# Patient Record
Sex: FEMALE | Race: WHITE | NOT HISPANIC OR LATINO | Employment: FULL TIME | URBAN - METROPOLITAN AREA
[De-identification: names, ages, dates, MRNs, and addresses within clinical notes are randomized per-mention and may not be internally consistent; named-entity substitution may affect disease eponyms.]

---

## 2017-02-25 ENCOUNTER — HOSPITAL ENCOUNTER (OUTPATIENT)
Dept: LAB | Facility: MEDICAL CENTER | Age: 32
End: 2017-02-25
Attending: OBSTETRICS & GYNECOLOGY
Payer: COMMERCIAL

## 2017-02-25 LAB
BASOPHILS # BLD AUTO: 0.05 K/UL (ref 0–0.12)
BASOPHILS NFR BLD AUTO: 0.8 % (ref 0–1.8)
EOSINOPHIL # BLD: 0.19 K/UL (ref 0–0.51)
EOSINOPHIL NFR BLD AUTO: 2.9 % (ref 0–6.9)
ERYTHROCYTE [DISTWIDTH] IN BLOOD BY AUTOMATED COUNT: 42.4 FL (ref 35.9–50)
HCT VFR BLD AUTO: 41 % (ref 37–47)
HGB BLD-MCNC: 14 G/DL (ref 12–16)
IMM GRANULOCYTES # BLD AUTO: 0.02 K/UL (ref 0–0.11)
IMM GRANULOCYTES NFR BLD AUTO: 0.3 % (ref 0–0.9)
IRON SATN MFR SERPL: 41 % (ref 15–55)
IRON SERPL-MCNC: 181 UG/DL (ref 40–170)
LYMPHOCYTES # BLD: 2.01 K/UL (ref 1–4.8)
LYMPHOCYTES NFR BLD AUTO: 30.6 % (ref 22–41)
MCH RBC QN AUTO: 31.9 PG (ref 27–33)
MCHC RBC AUTO-ENTMCNC: 34.1 G/DL (ref 33.6–35)
MCV RBC AUTO: 93.4 FL (ref 81.4–97.8)
MONOCYTES # BLD: 0.55 K/UL (ref 0–0.85)
MONOCYTES NFR BLD AUTO: 8.4 % (ref 0–13.4)
NEUTROPHILS # BLD: 3.75 K/UL (ref 2–7.15)
NEUTROPHILS NFR BLD AUTO: 57 % (ref 44–72)
NRBC # BLD AUTO: 0 K/UL
NRBC BLD-RTO: 0 /100 WBC
PLATELET # BLD AUTO: 234 K/UL (ref 164–446)
PMV BLD AUTO: 10.2 FL (ref 9–12.9)
RBC # BLD AUTO: 4.39 M/UL (ref 4.2–5.4)
RHEUMATOID FACT SERPL-ACNC: <10 IU/ML (ref 0–14)
T4 FREE SERPL-MCNC: 0.86 NG/DL (ref 0.53–1.43)
TIBC SERPL-MCNC: 438 UG/DL (ref 250–450)
TSH SERPL DL<=0.005 MIU/L-ACNC: 0.9 UIU/ML (ref 0.3–3.7)
WBC # BLD AUTO: 6.6 K/UL (ref 4.8–10.8)

## 2017-02-25 PROCEDURE — 85025 COMPLETE CBC W/AUTO DIFF WBC: CPT

## 2017-02-25 PROCEDURE — 36415 COLL VENOUS BLD VENIPUNCTURE: CPT

## 2017-02-25 PROCEDURE — 84439 ASSAY OF FREE THYROXINE: CPT

## 2017-02-25 PROCEDURE — 84443 ASSAY THYROID STIM HORMONE: CPT

## 2017-02-25 PROCEDURE — 86431 RHEUMATOID FACTOR QUANT: CPT

## 2017-02-25 PROCEDURE — 83540 ASSAY OF IRON: CPT

## 2017-02-25 PROCEDURE — 83550 IRON BINDING TEST: CPT

## 2019-01-30 ENCOUNTER — HOSPITAL ENCOUNTER (OUTPATIENT)
Dept: LAB | Facility: MEDICAL CENTER | Age: 34
End: 2019-01-30
Attending: FAMILY MEDICINE
Payer: COMMERCIAL

## 2019-01-30 ENCOUNTER — OFFICE VISIT (OUTPATIENT)
Dept: MEDICAL GROUP | Facility: PHYSICIAN GROUP | Age: 34
End: 2019-01-30
Payer: COMMERCIAL

## 2019-01-30 VITALS
SYSTOLIC BLOOD PRESSURE: 142 MMHG | HEART RATE: 77 BPM | RESPIRATION RATE: 18 BRPM | TEMPERATURE: 98.4 F | WEIGHT: 154 LBS | OXYGEN SATURATION: 97 % | DIASTOLIC BLOOD PRESSURE: 90 MMHG | BODY MASS INDEX: 24.75 KG/M2 | HEIGHT: 66 IN

## 2019-01-30 DIAGNOSIS — F41.9 ANXIETY: ICD-10-CM

## 2019-01-30 DIAGNOSIS — F12.10 MARIJUANA ABUSE: ICD-10-CM

## 2019-01-30 DIAGNOSIS — Z30.09 BIRTH CONTROL COUNSELING: ICD-10-CM

## 2019-01-30 LAB
ALBUMIN SERPL BCP-MCNC: 4.1 G/DL (ref 3.2–4.9)
ALBUMIN/GLOB SERPL: 1.4 G/DL
ALP SERPL-CCNC: 36 U/L (ref 30–99)
ALT SERPL-CCNC: 10 U/L (ref 2–50)
ANION GAP SERPL CALC-SCNC: 5 MMOL/L (ref 0–11.9)
AST SERPL-CCNC: 17 U/L (ref 12–45)
BILIRUB SERPL-MCNC: 1.2 MG/DL (ref 0.1–1.5)
BUN SERPL-MCNC: 17 MG/DL (ref 8–22)
CALCIUM SERPL-MCNC: 9.3 MG/DL (ref 8.5–10.5)
CHLORIDE SERPL-SCNC: 105 MMOL/L (ref 96–112)
CO2 SERPL-SCNC: 27 MMOL/L (ref 20–33)
CREAT SERPL-MCNC: 0.81 MG/DL (ref 0.5–1.4)
ERYTHROCYTE [DISTWIDTH] IN BLOOD BY AUTOMATED COUNT: 44.8 FL (ref 35.9–50)
GLOBULIN SER CALC-MCNC: 3 G/DL (ref 1.9–3.5)
GLUCOSE SERPL-MCNC: 95 MG/DL (ref 65–99)
HCT VFR BLD AUTO: 42.7 % (ref 37–47)
HGB BLD-MCNC: 14 G/DL (ref 12–16)
MCH RBC QN AUTO: 32.1 PG (ref 27–33)
MCHC RBC AUTO-ENTMCNC: 32.8 G/DL (ref 33.6–35)
MCV RBC AUTO: 97.9 FL (ref 81.4–97.8)
PLATELET # BLD AUTO: 273 K/UL (ref 164–446)
PMV BLD AUTO: 9.9 FL (ref 9–12.9)
POTASSIUM SERPL-SCNC: 4.2 MMOL/L (ref 3.6–5.5)
PROT SERPL-MCNC: 7.1 G/DL (ref 6–8.2)
RBC # BLD AUTO: 4.36 M/UL (ref 4.2–5.4)
SODIUM SERPL-SCNC: 137 MMOL/L (ref 135–145)
T4 FREE SERPL-MCNC: 0.84 NG/DL (ref 0.53–1.43)
TSH SERPL DL<=0.005 MIU/L-ACNC: 0.73 UIU/ML (ref 0.38–5.33)
WBC # BLD AUTO: 9 K/UL (ref 4.8–10.8)

## 2019-01-30 PROCEDURE — 99214 OFFICE O/P EST MOD 30 MIN: CPT | Performed by: FAMILY MEDICINE

## 2019-01-30 PROCEDURE — 80053 COMPREHEN METABOLIC PANEL: CPT

## 2019-01-30 PROCEDURE — 36415 COLL VENOUS BLD VENIPUNCTURE: CPT

## 2019-01-30 PROCEDURE — 84439 ASSAY OF FREE THYROXINE: CPT

## 2019-01-30 PROCEDURE — 85027 COMPLETE CBC AUTOMATED: CPT

## 2019-01-30 PROCEDURE — 84443 ASSAY THYROID STIM HORMONE: CPT

## 2019-01-30 RX ORDER — SERTRALINE HYDROCHLORIDE 25 MG/1
25 TABLET, FILM COATED ORAL DAILY
Qty: 30 TAB | Refills: 11 | Status: SHIPPED | OUTPATIENT
Start: 2019-01-30 | End: 2019-05-10

## 2019-01-30 ASSESSMENT — PATIENT HEALTH QUESTIONNAIRE - PHQ9
SUM OF ALL RESPONSES TO PHQ QUESTIONS 1-9: 8
CLINICAL INTERPRETATION OF PHQ2 SCORE: 2
5. POOR APPETITE OR OVEREATING: 1 - SEVERAL DAYS

## 2019-01-30 NOTE — PROGRESS NOTES
"cc: Anxiety      Subjective:     Lay CARPIO is a 33 y.o. female presenting for the following:     Left lower buttock with enlarging skin lesion for the last few months.  It is still not large but because it sticks up so far it is catching on her clothes and causing rubbing.  This is painful and itchy.    Anxiety/marijuana abuse-patient was a very anxious teenager and still has difficulty with anxiety.  She started using marijuana on a regular basis which was helpful for the anxiety initially.  However, she also notes that she feels more down and less energetic.  She has never tried an SSRI.  She would like to stop using marijuana regularly but when she tries to stop she has severe flares and anxiety.  He has never had any SI/HI.  She does have a young toddler at home which is a source of stress for her.  Also, she is going through a separation.    She has recently started seeing a therapist and is getting along well with her.    Review of systems:  All others reviewed and are negative.       Current Outpatient Prescriptions:   •  sertraline (ZOLOFT) 25 MG tablet, Take 1 Tab by mouth every day., Disp: 30 Tab, Rfl: 11    Allergies, past medical history, past surgical history, family history, social history reviewed and updated    Objective:     Vitals: /90 (BP Location: Left arm, Patient Position: Sitting, BP Cuff Size: Adult)   Pulse 77   Temp 36.9 °C (98.4 °F) (Temporal)   Resp 18   Ht 1.676 m (5' 6\")   Wt 69.9 kg (154 lb)   SpO2 97%   BMI 24.86 kg/m²   General: Alert, pleasant, NAD  HEENT: Normocephalic.   EOMI, no icterus or pallor.  Conjunctivae and lids normal. External ears normal. Oropharynx non-erythematous, mucous membranes moist.    Neck supple.  No thyromegaly or masses palpated. No cervical or supraclavicular lymphadenopathy.  Heart: Regular rate and rhythm.  S1 and S2 normal.  No murmurs appreciated.  Respiratory: Normal respiratory effort.  Clear to auscultation " bilaterally.  Abdomen: Non-distended, soft  Skin: Warm, dry, no rashes.  Musculoskeletal: Gait is normal.  Moves all extremities well.  Extremities: No leg edema.    Neurological: No tremors, sensation grossly intact,  tone/strength normal, gait is normal, CN2-12 grossly intact  Psych:  Affect is normal, judgement is good, memory is intact, grooming is appropriate.    Assessment/Plan:     Lay was seen today for annual exam and contraception.    Diagnoses and all orders for this visit:    Marijuana abuse/Anxiety: Patient would like to stop using marijuana but has difficulty with anxiety when she tries this.  She is currently seeing a therapist.  Discussed risks of SSRIs including black box warning today.  Patient willing to trial Zoloft daily.  Return to clinic in 4 weeks to discuss if well tolerated, return sooner if any severe side effects.  Will check blood tests for medical cause of anxiety but less likely, as patient has had difficulty since teenage years.  -     COMP METABOLIC PANEL; Future  -     sertraline (ZOLOFT) 25 MG tablet; Take 1 Tab by mouth every day.  -     TSH; Future  -     FREE THYROXINE; Future  -     CBC WITHOUT DIFFERENTIAL; Future    Birth control counseling: Patient has had mood side effects with hormonal contraceptives in the past.  She is interested in an IUD as she does not want any more children currently.  -     REFERRAL TO GYNECOLOGY    Return in about 4 weeks (around 2/27/2019) for skin lesion removal.

## 2019-02-25 ENCOUNTER — HOSPITAL ENCOUNTER (OUTPATIENT)
Facility: MEDICAL CENTER | Age: 34
End: 2019-02-25
Attending: FAMILY MEDICINE
Payer: COMMERCIAL

## 2019-02-25 ENCOUNTER — OFFICE VISIT (OUTPATIENT)
Dept: MEDICAL GROUP | Facility: PHYSICIAN GROUP | Age: 34
End: 2019-02-25
Payer: COMMERCIAL

## 2019-02-25 VITALS
HEIGHT: 66 IN | WEIGHT: 154 LBS | OXYGEN SATURATION: 98 % | BODY MASS INDEX: 24.75 KG/M2 | HEART RATE: 77 BPM | TEMPERATURE: 98 F | SYSTOLIC BLOOD PRESSURE: 118 MMHG | DIASTOLIC BLOOD PRESSURE: 78 MMHG | RESPIRATION RATE: 18 BRPM

## 2019-02-25 DIAGNOSIS — R20.9 DISTURBANCE OF SKIN SENSATION: ICD-10-CM

## 2019-02-25 DIAGNOSIS — L98.9 SKIN LESION: ICD-10-CM

## 2019-02-25 LAB — PATHOLOGY CONSULT NOTE: NORMAL

## 2019-02-25 PROCEDURE — 11104 PUNCH BX SKIN SINGLE LESION: CPT | Performed by: FAMILY MEDICINE

## 2019-02-25 PROCEDURE — 88305 TISSUE EXAM BY PATHOLOGIST: CPT

## 2019-02-25 PROCEDURE — 88304 TISSUE EXAM BY PATHOLOGIST: CPT

## 2019-02-25 PROCEDURE — 11105 PUNCH BX SKIN EA SEP/ADDL: CPT | Performed by: FAMILY MEDICINE

## 2019-02-25 NOTE — PROGRESS NOTES
"cc: skin lesion      Subjective:     Lay CARPIO is a 33 y.o. female presenting for the following:     Left lower/inner buttock with enlarging skin lesion for the last few months.  It is still not large but because it sticks up so far it is catching on her clothes and causing rubbing.  This is painful and itchy.    Also, she has noticed a mole on her back is catching on her bra. She thinks it has been there for years but it was not bothersome (itchy or painful) before now.     Review of systems:  All others reviewed and are negative.       Current Outpatient Prescriptions:   •  sertraline (ZOLOFT) 25 MG tablet, Take 1 Tab by mouth every day., Disp: 30 Tab, Rfl: 11    Allergies, past medical history, past surgical history, family history, social history reviewed and updated    Objective:     Vitals: /78 (BP Location: Left arm, Patient Position: Sitting, BP Cuff Size: Adult)   Pulse 77   Temp 36.7 °C (98 °F) (Temporal)   Resp 18   Ht 1.676 m (5' 6\")   Wt 69.9 kg (154 lb)   SpO2 98%   BMI 24.86 kg/m²   General: Alert, pleasant, NAD  Skin: Middle of back with ~5mm raised lesion with irregular hyperpigmentation. Clear borders.   Left inner buttock with ~3mm, raised, hyperpigmented lesion. No surrounding erythema.     Risks of procedure discussed including pain, reaction to anesthetic, infection, scarring, damage to surrounding structures, and possible need for further procedure.     Area cleaned with alcohol swab prior to lidocaine with epinephrine being injected subcutaneously.   Area cleaned x3 with betadine and back lesion removed by 5mm punch biopsy and buttock lesion removed by 3mm punch biopsy. Hemostasis achieved with pressure. Bandage applied.   Specimen sent for pathology.   Minimal blood loss. No complications. Patient tolerated procedure well. Aftercare discussed.       Assessment/Plan:     Lay was seen today for nevus.    Diagnoses and all orders for this visit:    Skin " lesion/Disturbance of skin sensation: 2 enlarging, raised, painful/itchy lesions removed today. Likely benign mole or possibly wart. Will await pathology.   -     Pathology Specimen; Future  -     Pathology Specimen; Future    Return if symptoms worsen or fail to improve.

## 2019-03-01 ENCOUNTER — TELEPHONE (OUTPATIENT)
Dept: MEDICAL GROUP | Facility: PHYSICIAN GROUP | Age: 34
End: 2019-03-01

## 2019-03-01 NOTE — TELEPHONE ENCOUNTER
Future Appointments       Provider Department Center    3/4/2019 2:15 PM Jenn Ulloa M.D. [facility], located within [facility]

## 2019-03-01 NOTE — TELEPHONE ENCOUNTER
----- Message from Jenn Ulloa M.D. sent at 3/1/2019  1:03 PM PST -----  Please call patient and let them know that the mole on her back looked okay but because of the type of mole it is, they suggest a wider excision. I can do this in the office, as it does not need to be a large cut, so please make her an appointment at her convenience.

## 2019-03-04 ENCOUNTER — OFFICE VISIT (OUTPATIENT)
Dept: MEDICAL GROUP | Facility: PHYSICIAN GROUP | Age: 34
End: 2019-03-04
Payer: COMMERCIAL

## 2019-03-04 ENCOUNTER — HOSPITAL ENCOUNTER (OUTPATIENT)
Facility: MEDICAL CENTER | Age: 34
End: 2019-03-04
Attending: FAMILY MEDICINE
Payer: COMMERCIAL

## 2019-03-04 VITALS
WEIGHT: 154 LBS | TEMPERATURE: 99.1 F | HEART RATE: 70 BPM | BODY MASS INDEX: 24.75 KG/M2 | HEIGHT: 66 IN | RESPIRATION RATE: 18 BRPM | SYSTOLIC BLOOD PRESSURE: 116 MMHG | OXYGEN SATURATION: 97 % | DIASTOLIC BLOOD PRESSURE: 70 MMHG

## 2019-03-04 DIAGNOSIS — D22.9 COMPOUND NEVUS: ICD-10-CM

## 2019-03-04 DIAGNOSIS — D48.5 NEOPLASM OF UNCERTAIN BEHAVIOR OF SKIN: ICD-10-CM

## 2019-03-04 PROCEDURE — 11401 EXC TR-EXT B9+MARG 0.6-1 CM: CPT | Performed by: FAMILY MEDICINE

## 2019-03-04 NOTE — Clinical Note
The pathologists recommended a wider excision of this compound nevus (not malignant but sometimes become malignant in the future). But I'm not sure how to bill that.

## 2019-03-04 NOTE — PROGRESS NOTES
"cc: f/u pathology result      Subjective:     Lay Barr is a 33 y.o. female presenting for the following:     Patient recently with biopsy of nevus on back. This was originally done as it was becoming painful and catching on her bra and she thought it was possibly enlarging.  Pathology returned with a compound nevus and a wider excision was recommended by the pathologist.    Patient agrees to have this procedure done today.    Review of systems:  All others reviewed and are negative.       Current Outpatient Prescriptions:   •  sertraline (ZOLOFT) 25 MG tablet, Take 1 Tab by mouth every day., Disp: 30 Tab, Rfl: 11    Allergies, past medical history, past surgical history, family history, social history reviewed and updated    Objective:     Vitals: /70 (BP Location: Right arm, Patient Position: Sitting, BP Cuff Size: Adult)   Pulse 70   Temp 37.3 °C (99.1 °F) (Temporal)   Resp 18   Ht 1.676 m (5' 6\")   Wt 69.9 kg (154 lb)   SpO2 97%   BMI 24.86 kg/m²   General: Alert, pleasant, NAD  Skin: Punch biopsy healing well with healthy scab over.    Risks of procedure discussed including pain, reaction to anesthetic, infection, scarring, damage to surrounding structures, and possible need for further procedure.   Area cleaned with alcohol swab prior to lidocaine with epinephrine being injected subcutaneously.   Area cleaned x3 with betadine and lesion removed by ~1cm excisional biopsy. 3 simple interrupted sutures placed. Hemostasis achieved and Bandage applied.   Specimen sent for pathology.   Minimal blood loss. No complications. Patient tolerated procedure well. Aftercare discussed.       Assessment/Plan:     Lay was seen today for follow-up.    Diagnoses and all orders for this visit:    Compound nevus: wider excision of compound nevus completed today per pathologist's recommendation.   -     Pathology Specimen; Future    Return in about 7 days (around 3/11/2019), or if symptoms worsen or fail " to improve, for stitch removal .

## 2019-03-12 ENCOUNTER — OFFICE VISIT (OUTPATIENT)
Dept: MEDICAL GROUP | Facility: PHYSICIAN GROUP | Age: 34
End: 2019-03-12
Payer: COMMERCIAL

## 2019-03-12 ENCOUNTER — APPOINTMENT (OUTPATIENT)
Dept: MEDICAL GROUP | Facility: PHYSICIAN GROUP | Age: 34
End: 2019-03-12
Payer: COMMERCIAL

## 2019-03-12 VITALS
WEIGHT: 154 LBS | TEMPERATURE: 98 F | HEART RATE: 76 BPM | HEIGHT: 66 IN | DIASTOLIC BLOOD PRESSURE: 76 MMHG | OXYGEN SATURATION: 97 % | SYSTOLIC BLOOD PRESSURE: 116 MMHG | BODY MASS INDEX: 24.75 KG/M2 | RESPIRATION RATE: 18 BRPM

## 2019-03-12 DIAGNOSIS — L02.91 ABSCESS: ICD-10-CM

## 2019-03-12 PROCEDURE — 56405 I&D VULVA/PERINEAL ABSCESS: CPT | Mod: 79 | Performed by: FAMILY MEDICINE

## 2019-03-12 PROCEDURE — 99024 POSTOP FOLLOW-UP VISIT: CPT | Performed by: FAMILY MEDICINE

## 2019-03-12 RX ORDER — DOXYCYCLINE HYCLATE 100 MG
100 TABLET ORAL 2 TIMES DAILY
Qty: 14 TAB | Refills: 0 | Status: SHIPPED | OUTPATIENT
Start: 2019-03-12 | End: 2019-05-10

## 2019-03-12 NOTE — PROGRESS NOTES
"cc: Stitch removal and abscess      Subjective:     Lay Barr is a 33 y.o. female presenting for the following:     Patient presents for stitch removal today but also mentions that she had a Brazilian wax last week and has developed a very sore area that she believes to be an abscess on her right labia majora.    Review of systems:  All others reviewed and are negative.       Current Outpatient Prescriptions:   •  doxycycline (VIBRAMYCIN) 100 MG Tab, Take 1 Tab by mouth 2 times a day., Disp: 14 Tab, Rfl: 0  •  sertraline (ZOLOFT) 25 MG tablet, Take 1 Tab by mouth every day., Disp: 30 Tab, Rfl: 11    Allergies, past medical history, past surgical history, family history, social history reviewed and updated    Objective:     Vitals: /76 (BP Location: Left arm, Patient Position: Sitting, BP Cuff Size: Adult)   Pulse 76   Temp 36.7 °C (98 °F) (Temporal)   Resp 18   Ht 1.676 m (5' 6\")   Wt 69.9 kg (154 lb)   SpO2 97%   BMI 24.86 kg/m²   General: Alert, pleasant, NAD  Skin:   Back: biopsy site healing well.  No erythema or discharge.  3 sutures removed.     Right labia majora-roughly 1 cm round, smooth subcutaneous mass.  Fluctuant.  Small central umbilication draining small amount of purulent fluid.  No surrounding erythema or swelling.    Risks of procedure including the drainage being unsuccessful, need for further drainage, worsening infection discussed.  Area cleaned with Betadine x3 and lidocaine without epinephrine injected subcutaneously.  3 mm incision made and purulent fluid drained with gentle pressure.  Bandage applied.  Minimal bleeding.  Patient did tolerate procedure well considering the area.  Aftercare including warm compresses and monitoring for cellulitis discussed.      Assessment/Plan:     Lay was seen today for lump.    Diagnoses and all orders for this visit:    Abscess-drained with simple incision and drainage today.  As area prone to rubbing, will also give antibiotic " for 7 days.  Discussed with patient signs of worsening infection or reaccumulation.  -     doxycycline (VIBRAMYCIN) 100 MG Tab; Take 1 Tab by mouth 2 times a day.    Return if symptoms worsen or fail to improve.

## 2019-03-12 NOTE — LETTER
[facility]  [address]  [place] [address]     March 12, 2019    Patient: Lay Barr   YOB: 1985   Date of Visit: 3/12/2019       To Whom It May Concern:    Lay Barr was seen and treated in our department on 3/12/2019. She will be able to return to work on 3/14/2019.         Sincerely,           Jenn Ulloa M.D.

## 2019-03-22 DIAGNOSIS — D22.9 COMPOUND NEVUS: ICD-10-CM

## 2019-05-10 ENCOUNTER — OFFICE VISIT (OUTPATIENT)
Dept: URGENT CARE | Facility: MEDICAL CENTER | Age: 34
End: 2019-05-10
Payer: COMMERCIAL

## 2019-05-10 ENCOUNTER — HOSPITAL ENCOUNTER (OUTPATIENT)
Dept: RADIOLOGY | Facility: MEDICAL CENTER | Age: 34
End: 2019-05-10
Attending: NURSE PRACTITIONER
Payer: COMMERCIAL

## 2019-05-10 VITALS
SYSTOLIC BLOOD PRESSURE: 120 MMHG | TEMPERATURE: 98.3 F | DIASTOLIC BLOOD PRESSURE: 90 MMHG | OXYGEN SATURATION: 98 % | BODY MASS INDEX: 25.92 KG/M2 | WEIGHT: 155.6 LBS | HEIGHT: 65 IN | HEART RATE: 88 BPM

## 2019-05-10 DIAGNOSIS — S69.91XA HAND INJURY, RIGHT, INITIAL ENCOUNTER: ICD-10-CM
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
## 2019-05-10 DIAGNOSIS — S60.221A CONTUSION OF RIGHT HAND, INITIAL ENCOUNTER: ICD-10-CM

## 2019-05-10 PROCEDURE — 73130 X-RAY EXAM OF HAND: CPT | Mod: RT

## 2019-05-10 PROCEDURE — 99214 OFFICE O/P EST MOD 30 MIN: CPT | Performed by: NURSE PRACTITIONER

## 2019-05-10 ASSESSMENT — ENCOUNTER SYMPTOMS
NAUSEA: 0
WEAKNESS: 0
FALLS: 0
CHILLS: 0
FEVER: 0
SHORTNESS OF BREATH: 0
ROS SKIN COMMENTS: NO AREA OF ABRASION
TINGLING: 0
WHEEZING: 0
SENSORY CHANGE: 0

## 2019-05-10 NOTE — LETTER
May 10, 2019         Patient: Lay Barr   YOB: 1985   Date of Visit: 5/10/2019           To Whom it May Concern:    Lay Barr was seen in my clinic on 5/10/2019. Please excuse her from work 5/10/2019 through 5/12/2019. She may return on 5/13/2019.    If you have any questions or concerns, please don't hesitate to call.        Sincerely,           SIL David.  Electronically Signed

## 2019-05-11 NOTE — PROGRESS NOTES
"Subjective:   Lay Barr is a 33 y.o. female who presents for Fall (xlast night; chasing dog and fell on R wrist )        Hand Injury   This is a new problem. The current episode started yesterday (States she was walking dog and pulled and accidentally tripped and fell onto right hand). The problem occurs constantly. The problem has been waxing and waning. Pertinent negatives include no chest pain, chills, congestion, fever, nausea or weakness. Exacerbated by: Making a fist. She has tried ice for the symptoms. The treatment provided mild relief.    Denies LOC or hitting hand.    Review of Systems   Constitutional: Negative for chills and fever.   HENT: Negative for congestion.    Respiratory: Negative for shortness of breath and wheezing.    Cardiovascular: Negative for chest pain.   Gastrointestinal: Negative for nausea.   Musculoskeletal: Positive for joint pain. Negative for falls.        Right hand pain   Skin:        No area of abrasion   Neurological: Negative for tingling, sensory change and weakness.     PMH:  has a past medical history of Allergy.  MEDS: No current outpatient prescriptions on file.  ALLERGIES: No Known Allergies  SURGHX:   Past Surgical History:   Procedure Laterality Date   • ABDOMINAL EXPLORATION      partal stomach removal (age 17)     SOCHX:  reports that she has never smoked. She has never used smokeless tobacco. She reports that she drinks alcohol. She reports that she uses drugs, including Marijuana.  FH: Family history was reviewed, no pertinent findings to report     Objective:   /90   Pulse 88   Temp 36.8 °C (98.3 °F) (Temporal)   Ht 1.651 m (5' 5\")   Wt 70.6 kg (155 lb 9.6 oz)   SpO2 98%   BMI 25.89 kg/m²   Physical Exam   Constitutional: She appears well-developed and well-nourished. No distress.   HENT:   Head: Normocephalic.   Right Ear: Hearing normal.   Left Ear: Hearing normal.   Nose: Nose normal.   Eyes: Pupils are equal, round, and reactive to light. " "Conjunctivae, EOM and lids are normal.   Neck: Normal range of motion.   Cardiovascular: Normal rate, regular rhythm and normal heart sounds.    Pulmonary/Chest: Effort normal and breath sounds normal. No respiratory distress. She has no decreased breath sounds. She has no wheezes.   Musculoskeletal:        Right wrist: She exhibits normal range of motion, no tenderness and no swelling.        Right hand: She exhibits decreased range of motion, tenderness and decreased capillary refill.        Hands:  Swelling and bruising noted to right ring, index and middle fingers. Pain when trying to make a fist to middle finger majority.   Neurological: She is alert.   Skin: Skin is warm. No abrasion and no rash noted. She is not diaphoretic.   Psychiatric: She has a normal mood and affect. Her speech is normal and behavior is normal. Judgment and thought content normal.   Vitals reviewed.        Assessment/Plan:   Assessment    1. Hand injury, right, initial encounter  - DX-HAND 3+ RIGHT; Future    2. Fall, initial encounter    3. Contusion of right hand, initial encounter    Xray hand:\"No radiographic evidence of acute injury.\"  Rest and elevate the affected area with pillows.  You may apply ice packs to the affected area up to 4 times daily for 30 minutes at a time  You may apply compression to the affected area by wrapping with an ace bandage  May take over-the-counter Ibuprofen 600-800 mg every 8 hours as needed for pain    Differential diagnosis, natural history, supportive care, and indications for immediate follow-up discussed.       "

## 2019-08-28 ENCOUNTER — HOSPITAL ENCOUNTER (OUTPATIENT)
Dept: RADIOLOGY | Facility: MEDICAL CENTER | Age: 34
End: 2019-08-28
Attending: OBSTETRICS & GYNECOLOGY
Payer: COMMERCIAL

## 2019-08-28 DIAGNOSIS — N64.4 PAINFUL BREASTS: ICD-10-CM

## 2019-08-28 DIAGNOSIS — N63.0 LUMP OR MASS IN BREAST: ICD-10-CM

## 2019-08-28 PROCEDURE — G0279 TOMOSYNTHESIS, MAMMO: HCPCS

## 2019-08-28 PROCEDURE — 76642 ULTRASOUND BREAST LIMITED: CPT | Mod: RT

## 2019-09-04 ENCOUNTER — OFFICE VISIT (OUTPATIENT)
Dept: MEDICAL GROUP | Facility: PHYSICIAN GROUP | Age: 34
End: 2019-09-04
Payer: COMMERCIAL

## 2019-09-04 VITALS
WEIGHT: 155 LBS | RESPIRATION RATE: 18 BRPM | HEART RATE: 78 BPM | BODY MASS INDEX: 25.83 KG/M2 | SYSTOLIC BLOOD PRESSURE: 120 MMHG | TEMPERATURE: 98 F | DIASTOLIC BLOOD PRESSURE: 88 MMHG | OXYGEN SATURATION: 98 % | HEIGHT: 65 IN

## 2019-09-04 DIAGNOSIS — Z91.89 OTHER SPECIFIED PERSONAL RISK FACTORS, NOT ELSEWHERE CLASSIFIED: ICD-10-CM

## 2019-09-04 DIAGNOSIS — Z13.6 SCREENING FOR CARDIOVASCULAR CONDITION: ICD-10-CM

## 2019-09-04 DIAGNOSIS — R93.89 ABNORMAL FINDING ON IMAGING: ICD-10-CM

## 2019-09-04 DIAGNOSIS — I70.90 ATHEROSCLEROSIS: ICD-10-CM

## 2019-09-04 PROCEDURE — 99213 OFFICE O/P EST LOW 20 MIN: CPT | Performed by: FAMILY MEDICINE

## 2019-09-04 NOTE — PROGRESS NOTES
"cc: abnormal mammogram      Subjective:     Lay Barr is a 33 y.o. female presenting for the following:     Patient recently completed mammogram and ultrasound of breast for multiple palpable lumps.  No signs of malignancy or cysts on breasts but imaging does note \"Calcified plaque in the arteries in bilateral breasts somewhat atypical in a 33-year-old. Recommend correlation with cholesterol and possible further evaluation with cardiac calcium scoring CT.\"    Patient does not smoke cigarettes.  No known family history of early cardiac disease or stroke, but some parts of the family unknown.    Review of systems:  All others reviewed and are negative.     No current outpatient medications on file.    Allergies, past medical history, past surgical history, family history, social history reviewed and updated    Objective:     Vitals: /88 (BP Location: Right arm, Patient Position: Sitting, BP Cuff Size: Adult)   Pulse 78   Temp 36.7 °C (98 °F) (Temporal)   Resp 18   Ht 1.651 m (5' 5\")   Wt 70.3 kg (155 lb)   SpO2 98%   BMI 25.79 kg/m²   General: Alert, pleasant, NAD  HEENT:   Neck supple. No bruit.  No thyromegaly or masses palpated.   Heart: Regular rate and rhythm.  S1 and S2 normal.  No murmurs appreciated.  Respiratory: Normal respiratory effort.  Clear to auscultation bilaterally.  Extremities: No leg edema.      Assessment/Plan:     Lay was seen today for warts and results.    Diagnoses and all orders for this visit:    Patient without signs of malignancy on recent breast ultrasound mammogram but does have multiple lumps.  Her mother is adopted so unknown known family history.  Suggest healthy [facility] for testing for BRCA gene.  Also, imaging with calcifications in the arteries.  Will check lipid panel and cardiac calcium scoring to ensure patient is not high risk.  She does not have diabetes and she does not smoke cigarettes.    Abnormal finding on imaging  -     CT-CARDIAC " SCORING; Future    Screening for cardiovascular condition  -     Lipid Profile; Future    Other specified personal risk factors, not elsewhere classified  -     CT-CARDIAC SCORING; Future    Atherosclerosis  -     CT-CARDIAC SCORING; Future      Return if symptoms worsen or fail to improve.

## 2019-09-05 PROBLEM — R93.89 ABNORMAL FINDING ON IMAGING: Status: ACTIVE | Noted: 2019-09-05

## 2019-09-27 ENCOUNTER — OFFICE VISIT (OUTPATIENT)
Dept: URGENT CARE | Facility: CLINIC | Age: 34
End: 2019-09-27
Payer: COMMERCIAL

## 2019-09-27 VITALS
TEMPERATURE: 98.5 F | HEART RATE: 83 BPM | DIASTOLIC BLOOD PRESSURE: 72 MMHG | OXYGEN SATURATION: 100 % | HEIGHT: 66 IN | SYSTOLIC BLOOD PRESSURE: 118 MMHG | WEIGHT: 160 LBS | BODY MASS INDEX: 25.71 KG/M2 | RESPIRATION RATE: 16 BRPM

## 2019-09-27 DIAGNOSIS — J02.8 ACUTE PHARYNGITIS DUE TO OTHER SPECIFIED ORGANISMS: ICD-10-CM

## 2019-09-27 DIAGNOSIS — J02.9 SORE THROAT: ICD-10-CM

## 2019-09-27 LAB
INT CON NEG: NEGATIVE
INT CON POS: POSITIVE
S PYO AG THROAT QL: NEGATIVE

## 2019-09-27 PROCEDURE — 99213 OFFICE O/P EST LOW 20 MIN: CPT | Performed by: NURSE PRACTITIONER

## 2019-09-27 PROCEDURE — 87880 STREP A ASSAY W/OPTIC: CPT | Performed by: NURSE PRACTITIONER

## 2019-09-27 ASSESSMENT — ENCOUNTER SYMPTOMS
SORE THROAT: 1
COUGH: 0
DIARRHEA: 0
EYE DISCHARGE: 0
MYALGIAS: 0
ORTHOPNEA: 0
HEADACHES: 0
CHILLS: 1
NAUSEA: 0
FEVER: 0

## 2019-09-28 NOTE — PROGRESS NOTES
Subjective:      Lay Barr is a 33 y.o. female who presents with Pharyngitis (with chils since this morning.)            HPI New. 33 year old female with sore throat and chills since this morning. She denies fever, nausea, congestion or cough. She has no ear pain or neck pain. She has not taken any medication for this issue.  Patient has no known allergies.  No current outpatient medications on file prior to visit.     No current facility-administered medications on file prior to visit.      Social History     Socioeconomic History   • Marital status:      Spouse name: Not on file   • Number of children: Not on file   • Years of education: Not on file   • Highest education level: Not on file   Occupational History   • Not on file   Social Needs   • Financial resource strain: Not on file   • Food insecurity:     Worry: Not on file     Inability: Not on file   • Transportation needs:     Medical: Not on file     Non-medical: Not on file   Tobacco Use   • Smoking status: Never Smoker   • Smokeless tobacco: Never Used   Substance and Sexual Activity   • Alcohol use: Yes     Comment: occ/socially    • Drug use: Yes     Types: Marijuana     Comment: daily use    • Sexual activity: Yes     Partners: Male     Birth control/protection: Condom   Lifestyle   • Physical activity:     Days per week: Not on file     Minutes per session: Not on file   • Stress: Not on file   Relationships   • Social connections:     Talks on phone: Not on file     Gets together: Not on file     Attends Yarsanism service: Not on file     Active member of club or organization: Not on file     Attends meetings of clubs or organizations: Not on file     Relationship status: Not on file   • Intimate partner violence:     Fear of current or ex partner: Not on file     Emotionally abused: Not on file     Physically abused: Not on file     Forced sexual activity: Not on file   Other Topics Concern   • Not on file   Social History  "Narrative   • Not on file     Breast Cancer-related family history is not on file.      Review of Systems   Constitutional: Positive for chills and malaise/fatigue. Negative for fever.   HENT: Positive for sore throat. Negative for congestion.    Eyes: Negative for discharge.   Respiratory: Negative for cough.    Cardiovascular: Negative for chest pain and orthopnea.   Gastrointestinal: Negative for diarrhea and nausea.   Musculoskeletal: Negative for myalgias.   Neurological: Negative for headaches.   Endo/Heme/Allergies: Negative for environmental allergies.          Objective:     /72 (BP Location: Left arm, Patient Position: Sitting, BP Cuff Size: Adult)   Pulse 83   Temp 36.9 °C (98.5 °F) (Temporal)   Resp 16   Ht 1.676 m (5' 6\")   Wt 72.6 kg (160 lb)   SpO2 100%   BMI 25.82 kg/m²      Physical Exam   Constitutional: She is oriented to person, place, and time. She appears well-developed and well-nourished. No distress.   HENT:   Head: Normocephalic and atraumatic.   Right Ear: External ear and ear canal normal. Tympanic membrane is not injected and not perforated. No middle ear effusion.   Left Ear: External ear and ear canal normal. Tympanic membrane is not injected and not perforated.  No middle ear effusion.   Nose: Mucosal edema present.   Mouth/Throat: Posterior oropharyngeal erythema present. No oropharyngeal exudate.   Eyes: Conjunctivae are normal. Right eye exhibits no discharge. Left eye exhibits no discharge.   Neck: Normal range of motion. Neck supple.   Cardiovascular: Normal rate, regular rhythm and normal heart sounds.   No murmur heard.  Pulmonary/Chest: Effort normal and breath sounds normal. No respiratory distress.   Musculoskeletal: Normal range of motion.   Normal movement of all 4 extremities.   Lymphadenopathy:     She has no cervical adenopathy.        Right: No supraclavicular adenopathy present.        Left: No supraclavicular adenopathy present.   Neurological: She is " alert and oriented to person, place, and time. Gait normal.   Skin: Skin is warm and dry.   Psychiatric: She has a normal mood and affect. Her behavior is normal. Thought content normal.   Nursing note and vitals reviewed.              Assessment/Plan:     1. Acute pharyngitis due to other specified organisms     2. Sore throat  POCT Rapid Strep A     Strep negative.  Viral illness at this time with no indication for antibiotics. Reviewed with patient expected course of illness and also reviewed OTC medications that may be used for symptom relief. Follow up 7-10 days if not improving.

## 2020-10-28 ENCOUNTER — HOSPITAL ENCOUNTER (OUTPATIENT)
Facility: MEDICAL CENTER | Age: 35
End: 2020-10-28
Attending: NURSE PRACTITIONER
Payer: COMMERCIAL

## 2020-10-28 PROCEDURE — 81003 URINALYSIS AUTO W/O SCOPE: CPT

## 2020-10-28 PROCEDURE — 87086 URINE CULTURE/COLONY COUNT: CPT

## 2020-10-30 LAB
APPEARANCE UR: CLEAR
BILIRUB UR QL STRIP.AUTO: NEGATIVE
COLOR UR: YELLOW
GLUCOSE UR STRIP.AUTO-MCNC: NEGATIVE MG/DL
KETONES UR STRIP.AUTO-MCNC: NEGATIVE MG/DL
LEUKOCYTE ESTERASE UR QL STRIP.AUTO: NEGATIVE
MICRO URNS: NORMAL
NITRITE UR QL STRIP.AUTO: NEGATIVE
PH UR STRIP.AUTO: 5.5 [PH] (ref 5–8)
PROT UR QL STRIP: NEGATIVE MG/DL
RBC UR QL AUTO: NEGATIVE
SP GR UR STRIP.AUTO: 1.02
UROBILINOGEN UR STRIP.AUTO-MCNC: 0.2 MG/DL

## 2020-11-01 LAB
BACTERIA UR CULT: NORMAL
SIGNIFICANT IND 70042: NORMAL
SITE SITE: NORMAL
SOURCE SOURCE: NORMAL

## 2021-05-27 ENCOUNTER — HOSPITAL ENCOUNTER (OUTPATIENT)
Dept: LAB | Facility: MEDICAL CENTER | Age: 36
End: 2021-05-27
Attending: NURSE PRACTITIONER
Payer: COMMERCIAL

## 2021-05-27 LAB
ALBUMIN SERPL BCP-MCNC: 4.3 G/DL (ref 3.2–4.9)
ALBUMIN/GLOB SERPL: 1.5 G/DL
ALP SERPL-CCNC: 41 U/L (ref 30–99)
ALT SERPL-CCNC: 13 U/L (ref 2–50)
ANION GAP SERPL CALC-SCNC: 10 MMOL/L (ref 7–16)
AST SERPL-CCNC: 16 U/L (ref 12–45)
BASOPHILS # BLD AUTO: 0.7 % (ref 0–1.8)
BASOPHILS # BLD: 0.05 K/UL (ref 0–0.12)
BILIRUB SERPL-MCNC: 1.6 MG/DL (ref 0.1–1.5)
BUN SERPL-MCNC: 13 MG/DL (ref 8–22)
CALCIUM SERPL-MCNC: 9.5 MG/DL (ref 8.5–10.5)
CHLORIDE SERPL-SCNC: 106 MMOL/L (ref 96–112)
CHOLEST SERPL-MCNC: 170 MG/DL (ref 100–199)
CO2 SERPL-SCNC: 24 MMOL/L (ref 20–33)
CREAT SERPL-MCNC: 0.85 MG/DL (ref 0.5–1.4)
EOSINOPHIL # BLD AUTO: 0.27 K/UL (ref 0–0.51)
EOSINOPHIL NFR BLD: 3.9 % (ref 0–6.9)
ERYTHROCYTE [DISTWIDTH] IN BLOOD BY AUTOMATED COUNT: 41.1 FL (ref 35.9–50)
FASTING STATUS PATIENT QL REPORTED: NORMAL
GLOBULIN SER CALC-MCNC: 2.8 G/DL (ref 1.9–3.5)
GLUCOSE SERPL-MCNC: 87 MG/DL (ref 65–99)
HCT VFR BLD AUTO: 40.9 % (ref 37–47)
HDLC SERPL-MCNC: 52 MG/DL
HGB BLD-MCNC: 14.8 G/DL (ref 12–16)
IMM GRANULOCYTES # BLD AUTO: 0.03 K/UL (ref 0–0.11)
IMM GRANULOCYTES NFR BLD AUTO: 0.4 % (ref 0–0.9)
LDLC SERPL CALC-MCNC: 108 MG/DL
LYMPHOCYTES # BLD AUTO: 1.71 K/UL (ref 1–4.8)
LYMPHOCYTES NFR BLD: 24.9 % (ref 22–41)
MCH RBC QN AUTO: 34 PG (ref 27–33)
MCHC RBC AUTO-ENTMCNC: 36.2 G/DL (ref 33.6–35)
MCV RBC AUTO: 94 FL (ref 81.4–97.8)
MONOCYTES # BLD AUTO: 0.54 K/UL (ref 0–0.85)
MONOCYTES NFR BLD AUTO: 7.9 % (ref 0–13.4)
NEUTROPHILS # BLD AUTO: 4.27 K/UL (ref 2–7.15)
NEUTROPHILS NFR BLD: 62.2 % (ref 44–72)
NRBC # BLD AUTO: 0 K/UL
NRBC BLD-RTO: 0 /100 WBC
PLATELET # BLD AUTO: 273 K/UL (ref 164–446)
PMV BLD AUTO: 9.5 FL (ref 9–12.9)
POTASSIUM SERPL-SCNC: 4.5 MMOL/L (ref 3.6–5.5)
PROT SERPL-MCNC: 7.1 G/DL (ref 6–8.2)
RBC # BLD AUTO: 4.35 M/UL (ref 4.2–5.4)
SODIUM SERPL-SCNC: 140 MMOL/L (ref 135–145)
TRIGL SERPL-MCNC: 51 MG/DL (ref 0–149)
TSH SERPL DL<=0.005 MIU/L-ACNC: 1.23 UIU/ML (ref 0.38–5.33)
WBC # BLD AUTO: 6.9 K/UL (ref 4.8–10.8)

## 2021-05-27 PROCEDURE — 82306 VITAMIN D 25 HYDROXY: CPT

## 2021-05-27 PROCEDURE — 85025 COMPLETE CBC W/AUTO DIFF WBC: CPT

## 2021-05-27 PROCEDURE — 80061 LIPID PANEL: CPT

## 2021-05-27 PROCEDURE — 36415 COLL VENOUS BLD VENIPUNCTURE: CPT

## 2021-05-27 PROCEDURE — 80053 COMPREHEN METABOLIC PANEL: CPT

## 2021-05-27 PROCEDURE — 84443 ASSAY THYROID STIM HORMONE: CPT

## 2021-05-29 LAB — 25(OH)D3 SERPL-MCNC: 29 NG/ML (ref 30–80)

## 2021-06-01 ENCOUNTER — OFFICE VISIT (OUTPATIENT)
Dept: MEDICAL GROUP | Facility: PHYSICIAN GROUP | Age: 36
End: 2021-06-01
Payer: COMMERCIAL

## 2021-06-01 VITALS
WEIGHT: 146 LBS | RESPIRATION RATE: 16 BRPM | HEART RATE: 80 BPM | OXYGEN SATURATION: 98 % | TEMPERATURE: 98.3 F | SYSTOLIC BLOOD PRESSURE: 112 MMHG | BODY MASS INDEX: 24.32 KG/M2 | HEIGHT: 65 IN | DIASTOLIC BLOOD PRESSURE: 80 MMHG

## 2021-06-01 DIAGNOSIS — Z01.84 IMMUNITY STATUS TESTING: ICD-10-CM

## 2021-06-01 DIAGNOSIS — N94.9 VAGINAL LUMP: ICD-10-CM

## 2021-06-01 DIAGNOSIS — R22.1 LUMP ON NECK: ICD-10-CM

## 2021-06-01 PROCEDURE — 99213 OFFICE O/P EST LOW 20 MIN: CPT | Performed by: NURSE PRACTITIONER

## 2021-06-01 ASSESSMENT — FIBROSIS 4 INDEX: FIB4 SCORE: 0.57

## 2021-06-01 ASSESSMENT — PATIENT HEALTH QUESTIONNAIRE - PHQ9: CLINICAL INTERPRETATION OF PHQ2 SCORE: 0

## 2021-06-01 NOTE — ASSESSMENT & PLAN NOTE
Chronic and ongoing. She states that she has felt the lump/mass on her posterior neck for the past few months. She states that she has lost 40 pounds since December 2020 and is wondering if she is noticing it more since she lost weight. She denies any pain to the area. She has not had any imaging done.

## 2021-06-01 NOTE — ASSESSMENT & PLAN NOTE
Acute and ongoing. She was told by her GYNO to establish with a PCP since she has. She states that there are times it is painful when she has sex. She is scheduled to have an ultrasound on 6/23/2021.

## 2021-06-01 NOTE — PROGRESS NOTES
Subjective  Chief Complaint  Establish care to manage her chronic conditions    History of Present Illness  Lay Barr is a 35 y.o. female. This patient is here today to establish care.  Her prior PCP was Dr. Jenn Ulloa.    Vaginal lump  Acute and ongoing. She was told by her GYNO to establish with a PCP since she has. She states that there are times it is painful when she has sex. She is scheduled to have an ultrasound on 6/23/2021.    Lump on neck  Chronic and ongoing. She states that she has felt the lump/mass on her posterior neck for the past few months. She states that she has lost 40 pounds since December 2020 and is wondering if she is noticing it more since she lost weight. She denies any pain to the area. She has not had any imaging done.    Past Medical History    Allergies: Patient has no known allergies.  Past Medical History:   Diagnosis Date   • Allergy      Past Surgical History:   Procedure Laterality Date   • ABDOMINAL EXPLORATION      partal stomach removal (age 17)     No current [facility]-ordered outpatient medications on file.     No current [facility]-ordered facility-administered medications on file.     Family History:    Family History   Problem Relation Age of Onset   • Lung Disease Mother         COPD   • Diabetes Mother    • Heart Disease Mother    • Hypertension Mother    • Stroke Father    • Hyperlipidemia Father    • Hypertension Father    • Thyroid Father       Personal/Social History:    Social History     Tobacco Use   • Smoking status: Never Smoker   • Smokeless tobacco: Never Used   Vaping Use   • Vaping Use: Never used   Substance Use Topics   • Alcohol use: Not Currently     Comment: Quit 7 months ago    • Drug use: Yes     Types: Marijuana     Comment: daily use      Social History     Social History Narrative   • Not on file      Review of Systems:     General: Negative for fever/chills and unexpected weight change.    Eyes:  Negative for vision changes, eye pain.   ENT:   "Negative for hearing changes, ear pain, congestion, sore throat, and neck pain.    Respiratory:  Negative for cough and dyspnea.     Cardiovascular:  Negative for chest pain and palpitations.   Gastrointestinal:  Negative for nausea/vomiting, changes in bowel habits, and abdominal pain.    Genitourinary:  Negative for dysuria and hematuria.    Musculoskeletal:  Negative for myalgias.    Skin:  Positive for lump.    Neurological:  Negative for numbness/tingling and headaches.    Heme/Lymph:  Does not bruise/bleed easily.     Objective  Physical Exam:   /80 (BP Location: Right arm, Patient Position: Sitting, BP Cuff Size: Adult)   Pulse 80   Temp 36.8 °C (98.3 °F) (Temporal)   Resp 16   Ht 1.651 m (5' 5\")   Wt 66.2 kg (146 lb)   SpO2 98%  Body mass index is 24.3 kg/m².  General:  Alert and oriented.  Well appearing.  NAD.  Head:  Normocephalic.   Eyes:  Eyes conjunctiva clear lids without ptosis, pupils equal and reactive to light accommodation.    Neck: Supple without JVD. No lymphadenopathy.  Pulmonary:  Normal effort.  Clear to ausculation without rales, ronchi, or wheezing.  Cardiovascular:  Regular rate and rhythm without murmur, rubs or gallop.  Radial pulses are intact and equal bilaterally.  Musculoskeletal:  No extremity cyanosis, clubbing, or edema.  Skin:  Warm and dry.  Palpable lump to posterior neck.  Psych: Normal mood and affect. Alert and oriented x3. Judgment and insight is normal.      Assessment/Plan   1. Vaginal lump  Acute and ongoing.  Did not examine area as patient just saw her GYNO and has an ultrasound ordered of the lump.    2. Lump on neck  Chronic and ongoing.  Discussed getting an ultrasound, she is agreeable.  - US-SOFT TISSUES OF HEAD - NECK; Future    3. Immunity status testing  - HEP B SURFACE AB; Future      Health Maintenance: Completed    Return in about 1 year (around 6/1/2022), or if symptoms worsen or fail to improve.    Please note that this dictation was created " using voice recognition software. I have made every reasonable attempt to correct obvious errors, but I expect that there are errors of grammar and possibly content that I did not discover before finalizing the note.    DALIA Zurita  [facility]

## 2021-06-29 ENCOUNTER — HOSPITAL ENCOUNTER (OUTPATIENT)
Dept: RADIOLOGY | Facility: MEDICAL CENTER | Age: 36
End: 2021-06-29
Attending: OBSTETRICS & GYNECOLOGY
Payer: COMMERCIAL

## 2021-06-29 ENCOUNTER — HOSPITAL ENCOUNTER (OUTPATIENT)
Dept: RADIOLOGY | Facility: MEDICAL CENTER | Age: 36
End: 2021-06-29
Attending: NURSE PRACTITIONER
Payer: COMMERCIAL

## 2021-06-29 DIAGNOSIS — R19.07 GENERALIZED ABDOMINAL OR PELVIC SWELLING OR MASS OR LUMP: ICD-10-CM

## 2021-06-29 DIAGNOSIS — R22.1 LUMP ON NECK: ICD-10-CM

## 2021-06-29 PROCEDURE — 76536 US EXAM OF HEAD AND NECK: CPT

## 2021-06-29 PROCEDURE — 76830 TRANSVAGINAL US NON-OB: CPT

## 2021-06-30 ENCOUNTER — TELEPHONE (OUTPATIENT)
Dept: MEDICAL GROUP | Facility: PHYSICIAN GROUP | Age: 36
End: 2021-06-30

## 2021-06-30 NOTE — TELEPHONE ENCOUNTER
Informed pt of Dr Andrade Tabor message below.  Advised pt to make sure if she has any other questions to follow up with her PCP.    Ultrasound result  Received: Today  Andrade Tabor III, M.D.  Mariluz Pete, Beny Ass't  Please let the patient know that Anabella is out of the office the rest of this week.  I did review her ultrasound report and it shows a normal-appearing lymph node of her neck.  No further work-up is needed.    Dr. Zafar

## 2021-07-01 ENCOUNTER — TELEPHONE (OUTPATIENT)
Dept: MEDICAL GROUP | Facility: PHYSICIAN GROUP | Age: 36
End: 2021-07-01

## 2021-07-01 NOTE — RESULT ENCOUNTER NOTE
Please call Lay and let her know that the results of her ultrasound of her neck reveal superficial normal lymph node, which is normal.  The radiologist does not recommend any follow up.  If she has any further questions, let her know Anabella will be back next week,  Laya:)

## 2021-07-01 NOTE — TELEPHONE ENCOUNTER
----- Message from SALVADOR Quiroga sent at 7/1/2021  9:22 AM PDT -----  Please call Lay and let her know that the results of her ultrasound of her neck reveal superficial normal lymph node, which is normal.  The radiologist does not recommend any follow up.  If she has any further questions, let her know Anabella will be back next week,  Laya:)

## 2021-08-30 ENCOUNTER — PRE-ADMISSION TESTING (OUTPATIENT)
Dept: ADMISSIONS | Facility: MEDICAL CENTER | Age: 36
End: 2021-08-30
Attending: SURGERY
Payer: COMMERCIAL

## 2021-08-30 NOTE — PREPROCEDURE INSTRUCTIONS
"Pre-admit appointment completed. \"Preparing for your Procedure\" instructions given to Pt via phone with verbal, emailed written instructions, along with video content. Pt states all instructions given are understood and to call pre-admit or Dr's office for additional questions or any symptoms of illness/covid develop prior to DOS. Medications the patient will take the morning of surgery per anesthesia protocol: None    Denies anesthesia complications  "

## 2021-09-20 ENCOUNTER — ANESTHESIA (OUTPATIENT)
Dept: SURGERY | Facility: MEDICAL CENTER | Age: 36
End: 2021-09-20
Payer: COMMERCIAL

## 2021-09-20 ENCOUNTER — ANESTHESIA EVENT (OUTPATIENT)
Dept: SURGERY | Facility: MEDICAL CENTER | Age: 36
End: 2021-09-20
Payer: COMMERCIAL

## 2021-09-20 ENCOUNTER — HOSPITAL ENCOUNTER (OUTPATIENT)
Facility: MEDICAL CENTER | Age: 36
End: 2021-09-20
Attending: SURGERY | Admitting: SURGERY
Payer: COMMERCIAL

## 2021-09-20 VITALS
OXYGEN SATURATION: 93 % | WEIGHT: 145.5 LBS | TEMPERATURE: 97.2 F | HEIGHT: 65 IN | SYSTOLIC BLOOD PRESSURE: 143 MMHG | RESPIRATION RATE: 16 BRPM | HEART RATE: 66 BPM | DIASTOLIC BLOOD PRESSURE: 76 MMHG | BODY MASS INDEX: 24.24 KG/M2

## 2021-09-20 DIAGNOSIS — G89.18 POSTOPERATIVE PAIN: ICD-10-CM

## 2021-09-20 LAB
EXTERNAL QUALITY CONTROL: NORMAL
HCG UR QL: NEGATIVE
SARS-COV+SARS-COV-2 AG RESP QL IA.RAPID: NEGATIVE

## 2021-09-20 PROCEDURE — 81025 URINE PREGNANCY TEST: CPT

## 2021-09-20 PROCEDURE — 700101 HCHG RX REV CODE 250: Performed by: ANESTHESIOLOGY

## 2021-09-20 PROCEDURE — 700101 HCHG RX REV CODE 250: Performed by: SURGERY

## 2021-09-20 PROCEDURE — 160009 HCHG ANES TIME/MIN: Performed by: SURGERY

## 2021-09-20 PROCEDURE — 160041 HCHG SURGERY MINUTES - EA ADDL 1 MIN LEVEL 4: Performed by: SURGERY

## 2021-09-20 PROCEDURE — 502714 HCHG ROBOTIC SURGERY SERVICES: Performed by: SURGERY

## 2021-09-20 PROCEDURE — 500002 HCHG ADHESIVE, DERMABOND: Performed by: SURGERY

## 2021-09-20 PROCEDURE — 160035 HCHG PACU - 1ST 60 MINS PHASE I: Performed by: SURGERY

## 2021-09-20 PROCEDURE — C1781 MESH (IMPLANTABLE): HCPCS | Performed by: SURGERY

## 2021-09-20 PROCEDURE — 160029 HCHG SURGERY MINUTES - 1ST 30 MINS LEVEL 4: Performed by: SURGERY

## 2021-09-20 PROCEDURE — 160046 HCHG PACU - 1ST 60 MINS PHASE II: Performed by: SURGERY

## 2021-09-20 PROCEDURE — 87426 SARSCOV CORONAVIRUS AG IA: CPT | Performed by: SURGERY

## 2021-09-20 PROCEDURE — 700111 HCHG RX REV CODE 636 W/ 250 OVERRIDE (IP): Performed by: ANESTHESIOLOGY

## 2021-09-20 PROCEDURE — 160002 HCHG RECOVERY MINUTES (STAT): Performed by: SURGERY

## 2021-09-20 PROCEDURE — 160048 HCHG OR STATISTICAL LEVEL 1-5: Performed by: SURGERY

## 2021-09-20 PROCEDURE — A9270 NON-COVERED ITEM OR SERVICE: HCPCS | Performed by: ANESTHESIOLOGY

## 2021-09-20 PROCEDURE — 700102 HCHG RX REV CODE 250 W/ 637 OVERRIDE(OP): Performed by: ANESTHESIOLOGY

## 2021-09-20 PROCEDURE — 160025 RECOVERY II MINUTES (STATS): Performed by: SURGERY

## 2021-09-20 PROCEDURE — 501570 HCHG TROCAR, SEPARATOR: Performed by: SURGERY

## 2021-09-20 PROCEDURE — 700105 HCHG RX REV CODE 258: Performed by: SURGERY

## 2021-09-20 PROCEDURE — 501838 HCHG SUTURE GENERAL: Performed by: SURGERY

## 2021-09-20 DEVICE — MESH PROGRIP LAPROSCOPIC SELF FIXATING (1/CA): Type: IMPLANTABLE DEVICE | Site: GROIN | Status: FUNCTIONAL

## 2021-09-20 RX ORDER — KETOROLAC TROMETHAMINE 30 MG/ML
INJECTION, SOLUTION INTRAMUSCULAR; INTRAVENOUS PRN
Status: DISCONTINUED | OUTPATIENT
Start: 2021-09-20 | End: 2021-09-20 | Stop reason: SURG

## 2021-09-20 RX ORDER — DIPHENHYDRAMINE HYDROCHLORIDE 50 MG/ML
25 INJECTION INTRAMUSCULAR; INTRAVENOUS EVERY 6 HOURS PRN
Status: DISCONTINUED | OUTPATIENT
Start: 2021-09-20 | End: 2021-09-20 | Stop reason: HOSPADM

## 2021-09-20 RX ORDER — BUPIVACAINE HYDROCHLORIDE AND EPINEPHRINE 5; 5 MG/ML; UG/ML
INJECTION, SOLUTION PERINEURAL
Status: DISCONTINUED | OUTPATIENT
Start: 2021-09-20 | End: 2021-09-20 | Stop reason: HOSPADM

## 2021-09-20 RX ORDER — OXYCODONE HCL 5 MG/5 ML
5 SOLUTION, ORAL ORAL
Status: COMPLETED | OUTPATIENT
Start: 2021-09-20 | End: 2021-09-20

## 2021-09-20 RX ORDER — ONDANSETRON 2 MG/ML
INJECTION INTRAMUSCULAR; INTRAVENOUS PRN
Status: DISCONTINUED | OUTPATIENT
Start: 2021-09-20 | End: 2021-09-20 | Stop reason: SURG

## 2021-09-20 RX ORDER — OXYCODONE HYDROCHLORIDE 5 MG/1
5 TABLET ORAL EVERY 4 HOURS PRN
Qty: 12 TABLET | Refills: 0 | Status: SHIPPED | OUTPATIENT
Start: 2021-09-20 | End: 2021-09-23

## 2021-09-20 RX ORDER — ONDANSETRON 4 MG/1
4 TABLET, ORALLY DISINTEGRATING ORAL EVERY 6 HOURS PRN
Qty: 18 TABLET | Refills: 0 | Status: SHIPPED | OUTPATIENT
Start: 2021-09-20 | End: 2023-02-02

## 2021-09-20 RX ORDER — DIPHENHYDRAMINE HYDROCHLORIDE 50 MG/ML
12.5 INJECTION INTRAMUSCULAR; INTRAVENOUS
Status: DISCONTINUED | OUTPATIENT
Start: 2021-09-20 | End: 2021-09-20 | Stop reason: HOSPADM

## 2021-09-20 RX ORDER — LIDOCAINE HYDROCHLORIDE 20 MG/ML
INJECTION, SOLUTION EPIDURAL; INFILTRATION; INTRACAUDAL; PERINEURAL PRN
Status: DISCONTINUED | OUTPATIENT
Start: 2021-09-20 | End: 2021-09-20 | Stop reason: SURG

## 2021-09-20 RX ORDER — IBUPROFEN 600 MG/1
600 TABLET ORAL EVERY 6 HOURS
Qty: 45 TABLET | Refills: 0 | Status: SHIPPED | OUTPATIENT
Start: 2021-09-20 | End: 2023-02-02

## 2021-09-20 RX ORDER — SODIUM CHLORIDE, SODIUM LACTATE, POTASSIUM CHLORIDE, CALCIUM CHLORIDE 600; 310; 30; 20 MG/100ML; MG/100ML; MG/100ML; MG/100ML
INJECTION, SOLUTION INTRAVENOUS CONTINUOUS
Status: DISCONTINUED | OUTPATIENT
Start: 2021-09-20 | End: 2021-09-20 | Stop reason: HOSPADM

## 2021-09-20 RX ORDER — POLYETHYLENE GLYCOL 3350 17 G/17G
17 POWDER, FOR SOLUTION ORAL DAILY
Qty: 20 EACH | Refills: 0 | Status: SHIPPED | OUTPATIENT
Start: 2021-09-20 | End: 2023-02-02

## 2021-09-20 RX ORDER — HALOPERIDOL 5 MG/ML
1 INJECTION INTRAMUSCULAR
Status: DISCONTINUED | OUTPATIENT
Start: 2021-09-20 | End: 2021-09-20 | Stop reason: HOSPADM

## 2021-09-20 RX ORDER — ACETAMINOPHEN 325 MG/1
650 TABLET ORAL EVERY 6 HOURS
Qty: 60 TABLET | Refills: 0 | Status: SHIPPED | OUTPATIENT
Start: 2021-09-20 | End: 2023-02-02

## 2021-09-20 RX ORDER — OXYCODONE HCL 5 MG/5 ML
10 SOLUTION, ORAL ORAL
Status: COMPLETED | OUTPATIENT
Start: 2021-09-20 | End: 2021-09-20

## 2021-09-20 RX ORDER — DIPHENHYDRAMINE HCL 25 MG
25 TABLET ORAL EVERY 6 HOURS PRN
Status: DISCONTINUED | OUTPATIENT
Start: 2021-09-20 | End: 2021-09-20 | Stop reason: HOSPADM

## 2021-09-20 RX ORDER — HYDROMORPHONE HYDROCHLORIDE 1 MG/ML
0.1 INJECTION, SOLUTION INTRAMUSCULAR; INTRAVENOUS; SUBCUTANEOUS
Status: DISCONTINUED | OUTPATIENT
Start: 2021-09-20 | End: 2021-09-20 | Stop reason: HOSPADM

## 2021-09-20 RX ORDER — HYDROMORPHONE HYDROCHLORIDE 1 MG/ML
0.4 INJECTION, SOLUTION INTRAMUSCULAR; INTRAVENOUS; SUBCUTANEOUS
Status: DISCONTINUED | OUTPATIENT
Start: 2021-09-20 | End: 2021-09-20 | Stop reason: HOSPADM

## 2021-09-20 RX ORDER — MEPERIDINE HYDROCHLORIDE 25 MG/ML
6.25 INJECTION INTRAMUSCULAR; INTRAVENOUS; SUBCUTANEOUS
Status: DISCONTINUED | OUTPATIENT
Start: 2021-09-20 | End: 2021-09-20 | Stop reason: HOSPADM

## 2021-09-20 RX ORDER — CEFAZOLIN SODIUM 1 G/3ML
INJECTION, POWDER, FOR SOLUTION INTRAMUSCULAR; INTRAVENOUS PRN
Status: DISCONTINUED | OUTPATIENT
Start: 2021-09-20 | End: 2021-09-20 | Stop reason: SURG

## 2021-09-20 RX ORDER — ONDANSETRON 2 MG/ML
4 INJECTION INTRAMUSCULAR; INTRAVENOUS
Status: COMPLETED | OUTPATIENT
Start: 2021-09-20 | End: 2021-09-20

## 2021-09-20 RX ORDER — HYDROMORPHONE HYDROCHLORIDE 1 MG/ML
0.2 INJECTION, SOLUTION INTRAMUSCULAR; INTRAVENOUS; SUBCUTANEOUS
Status: DISCONTINUED | OUTPATIENT
Start: 2021-09-20 | End: 2021-09-20 | Stop reason: HOSPADM

## 2021-09-20 RX ADMIN — FENTANYL CITRATE 50 MCG: 50 INJECTION, SOLUTION INTRAMUSCULAR; INTRAVENOUS at 09:06

## 2021-09-20 RX ADMIN — OXYCODONE HYDROCHLORIDE 10 MG: 5 SOLUTION ORAL at 09:14

## 2021-09-20 RX ADMIN — CEFAZOLIN 1 G: 330 INJECTION, POWDER, FOR SOLUTION INTRAMUSCULAR; INTRAVENOUS at 07:45

## 2021-09-20 RX ADMIN — ROCURONIUM BROMIDE 50 MG: 10 INJECTION INTRAVENOUS at 07:37

## 2021-09-20 RX ADMIN — LIDOCAINE HYDROCHLORIDE 0.5 ML: 10 INJECTION, SOLUTION INFILTRATION; PERINEURAL at 07:03

## 2021-09-20 RX ADMIN — SUCCINYLCHOLINE CHLORIDE 100 MG: 20 INJECTION, SOLUTION INTRAMUSCULAR; INTRAVENOUS at 07:37

## 2021-09-20 RX ADMIN — FENTANYL CITRATE 250 MCG: 50 INJECTION, SOLUTION INTRAMUSCULAR; INTRAVENOUS at 07:29

## 2021-09-20 RX ADMIN — MIDAZOLAM HYDROCHLORIDE 2 MG: 1 INJECTION, SOLUTION INTRAMUSCULAR; INTRAVENOUS at 07:29

## 2021-09-20 RX ADMIN — LIDOCAINE HYDROCHLORIDE 100 MG: 20 INJECTION, SOLUTION EPIDURAL; INFILTRATION; INTRACAUDAL; PERINEURAL at 07:37

## 2021-09-20 RX ADMIN — SODIUM CHLORIDE, POTASSIUM CHLORIDE, SODIUM LACTATE AND CALCIUM CHLORIDE: 600; 310; 30; 20 INJECTION, SOLUTION INTRAVENOUS at 07:03

## 2021-09-20 RX ADMIN — ONDANSETRON 4 MG: 2 INJECTION INTRAMUSCULAR; INTRAVENOUS at 08:47

## 2021-09-20 RX ADMIN — SUGAMMADEX 200 MG: 100 INJECTION, SOLUTION INTRAVENOUS at 08:47

## 2021-09-20 RX ADMIN — ONDANSETRON 4 MG: 2 INJECTION INTRAMUSCULAR; INTRAVENOUS at 09:02

## 2021-09-20 RX ADMIN — FENTANYL CITRATE 25 MCG: 50 INJECTION, SOLUTION INTRAMUSCULAR; INTRAVENOUS at 09:14

## 2021-09-20 RX ADMIN — KETOROLAC TROMETHAMINE 30 MG: 30 INJECTION, SOLUTION INTRAMUSCULAR at 08:47

## 2021-09-20 RX ADMIN — FENTANYL CITRATE 25 MCG: 50 INJECTION, SOLUTION INTRAMUSCULAR; INTRAVENOUS at 09:20

## 2021-09-20 RX ADMIN — PROPOFOL 200 MG: 10 INJECTION, EMULSION INTRAVENOUS at 07:37

## 2021-09-20 RX ADMIN — MEPERIDINE HYDROCHLORIDE 6.25 MG: 25 INJECTION INTRAMUSCULAR; INTRAVENOUS; SUBCUTANEOUS at 09:03

## 2021-09-20 ASSESSMENT — FIBROSIS 4 INDEX: FIB4 SCORE: 0.57

## 2021-09-20 ASSESSMENT — PAIN DESCRIPTION - PAIN TYPE: TYPE: ACUTE PAIN;SURGICAL PAIN

## 2021-09-20 NOTE — OR NURSING
0859- Pt arrived from OR post REPAIR, HERNIA, INGUINAL, ROBOT-ASSISTED, USING DA TRACY XI - bilateral WITH MESH - Bilateral, report received. Pt breathing spontaneously on 6L O2 via mask. 3 incision sites to abdomen clean and dry, closed with dermabond. CMS intact. Pt shivering and reporting pain 8/10 and nausea. Cold pack placed to abdomen.       0903-medicated for nausea and shivering.     0911-pt no longer shivering     0914-nausea Improved. Given oral medication for pain. Tolerating PO     0915-placed on bedpan     0918-bedpan removed pt unable to pee     0920-pt reports pain level still 4/10, medicated per orders     0930-called and updated pts family-Rufino     0944-called report to Cecille ESCOTO

## 2021-09-20 NOTE — ANESTHESIA PROCEDURE NOTES
Airway    Date/Time: 9/20/2021 7:37 AM  Performed by: Fam Irvin M.D.  Authorized by: Fam Irvin M.D.     Location:  OR  Urgency:  Elective  Indications for Airway Management:  Anesthesia      Spontaneous Ventilation: absent    Sedation Level:  Deep  Preoxygenated: Yes    Patient Position:  Sniffing  Final Airway Type:  Endotracheal airway  Final Endotracheal Airway:  ETT  Cuffed: Yes    Technique Used for Successful ETT Placement:  Direct laryngoscopy    Insertion Site:  Oral  Blade Type:  Shaggy  Laryngoscope Blade/Videolaryngoscope Blade Size:  3  ETT Size (mm):  7.0  Measured from:  Teeth  ETT to Teeth (cm):  22  Placement Verified by: auscultation and capnometry    Cormack-Lehane Classification:  Grade I - full view of glottis  Number of Attempts at Approach:  1

## 2021-09-20 NOTE — DISCHARGE INSTRUCTIONS
ACTIVITY: Rest and take it easy for the first 24 hours.  A responsible adult is recommended to remain with you during that time.  It is normal to feel sleepy.  We encourage you to not do anything that requires balance, judgment or coordination.    MILD FLU-LIKE SYMPTOMS ARE NORMAL. YOU MAY EXPERIENCE GENERALIZED MUSCLE ACHES, THROAT IRRITATION, HEADACHE AND/OR SOME NAUSEA.    FOR 24 HOURS DO NOT:  Drive, operate machinery or run household appliances.  Drink beer or alcoholic beverages.   Make important decisions or sign legal documents.    SPECIAL INSTRUCTIONS:    Inguinal Discharge Instructions:    ACTIVITIES: Upon discharge from the hospital, the day of surgery it is requested that you do no significant physical activity and limit mental activities, as you have had sedation. The day after surgery, you may resume activities of daily living, but for four weeks, it is recommended that you do no strenuous activities or heavy lifting (greater than 15 pounds).     DRIVING: You may drive whenever you are off pain medications and are able to perform the activities needed to drive, i.e. turning, bending, twisting, etc.     WOUND: It is not unusual for patients to experience swelling and even bruising at the hernia repair site. With inguinal hernias, sometimes the bruising and swelling may extend on to the penis or into the scrotum of male patients. This will resolve over the next few days.     ICE: please use ice on the wound to decrease the swelling for the first 24 hours and then discontinue.     BATHING: The dressing can be removed 48 hours after surgery and the wound can then be wetted in a shower as normal, but avoid submersion in water (tub bath) for at least 2 weeks.    PAIN MEDICATION: You will be given a prescription for pain medication at discharge. Please take these as directed. It is important to remember not to take medications on an empty stomach as this may cause nausea.     BOWEL FUNCTION: After hernia  repair, it is not uncommon for patients to experience constipation. This is due to decreasing activity levels as well as pain medications. You may wish to use a stool softener beginning immediately after surgery, and you may or may not need to use a laxative (Milk of Magnesia, Ex-lax; Senokot, etc.) as well.            CALL IF YOU HAVE: Drainage or fluid from incision that may be foul smelling,  increased tenderness or soreness at the wound or the wound edges are no  longer together,redness or swelling at the incision site. Please do not hesitate to  call with any other questions.     APPOINTMENT: Contact our office at [phone] for a follow-up appointment in 1 week following your procedure.     If you have any additional questions, please do not hesitate to call the office.       DIET: To avoid nausea, slowly advance diet as tolerated, avoiding spicy or greasy foods for the first day. INCREASE FLUIDS AND FIBER TO AVOID CONSTIPATION.      You should CALL YOUR PHYSICIAN if you develop:  Fever greater than 101 degrees F.  Pain not relieved by medication, or persistent nausea or vomiting.  Excessive bleeding (blood soaking through dressing) or unexpected drainage from the wound.  Extreme redness or swelling around the incision site, drainage of pus or foul smelling drainage.  Inability to urinate or empty your bladder within 8 hours.  Problems with breathing or chest pain.    You should call [phone] if you develop problems with breathing or chest pain.  If you are unable to contact your doctor or surgical center, you should go to the nearest emergency room or urgent care center.  Physician's telephone #: [phone]    If any questions arise, call your doctor.  If your doctor is not available, please feel free to call the Surgical Center at [phone]. The Contact Center is open Monday through Friday 7AM to 5PM and may speak to a nurse at [phone], or toll free at [phone].     A registered nurse may call  you a few days after your surgery to see how you are doing after your procedure.    MEDICATIONS: Resume taking daily medication.  Take prescribed pain medication with food.  If no medication is prescribed, you may take non-aspirin pain medication if needed.  PAIN MEDICATION CAN BE VERY CONSTIPATING.  Take a stool softener or laxative such as senokot, pericolace, or milk of magnesia if needed.    Prescription given for tylenol, motrin, oxycodone .  Last pain medication given at 0915 am.    If your physician has prescribed pain medication that includes Acetaminophen (Tylenol), do not take additional Acetaminophen (Tylenol) while taking the prescribed medication.    Depression / Suicide Risk    As you are discharged from this [facility] facility, it is important to learn how to keep safe from harming yourself.    Recognize the warning signs:  · Abrupt changes in personality, positive or negative- including increase in energy   · Giving away possessions  · Change in eating patterns- significant weight changes-  positive or negative  · Change in sleeping patterns- unable to sleep or sleeping all the time   · Unwillingness or inability to communicate  · Depression  · Unusual sadness, discouragement and loneliness  · Talk of wanting to die  · Neglect of personal appearance   · Rebelliousness- reckless behavior  · Withdrawal from people/activities they love  · Confusion- inability to concentrate     If you or a loved one observes any of these behaviors or has concerns about self-harm, here's what you can do:  · Talk about it- your feelings and reasons for harming yourself  · Remove any means that you might use to hurt yourself (examples: pills, rope, extension cords, firearm)  · Get professional help from the community (Mental Health, Substance Abuse, psychological counseling)  · Do not be alone:Call your Safe Contact- someone whom you trust who will be there for you.  · Call your local CRISIS HOTLINE 637-1804 or  575-464-6886  · Call your local Children's Mobile Crisis Response Team [place] (829) 462-3068 or www.[place].[employer]  · Call the toll free National Suicide Prevention Hotlines   · National Suicide Prevention Lifeline 271-848-AKVA (7037)  · National [place] Line Network 800-SUICIDE (915-6085)

## 2021-09-20 NOTE — ANESTHESIA TIME REPORT
Anesthesia Start and Stop Event Times     Date Time Event    9/20/2021 0729 Ready for Procedure     0729 Anesthesia Start     0916 Anesthesia Stop        Responsible Staff  09/20/21    Name Role Begin End    Fam Irvin M.D. Anesth 0729 0916        Preop Diagnosis (Free Text):  Pre-op Diagnosis     LEFT INGUINAL HERNIA        Preop Diagnosis (Codes):  Diagnosis Information     Diagnosis Code(s): Left inguinal hernia [K40.90]        Post op Diagnosis  Inguinal hernia      Premium Reason  Non-Premium    Comments:

## 2021-09-20 NOTE — OP REPORT
Operative Report    Date: 9/20/2021    Surgeon: Shahid Zaragoza M.D.     Assistant: None    Pre-operative Diagnosis: bilateral Inguinal Hernia    Post-operative Diagnosis: Same     Procedure: Robotic bilateral Inguinal Hernia Repair with Mesh    ASA Classification: II.    Indications: This is a 35 y.o. female who presented with symptoms of bilateral Inguinal Hernia. Here for repair    The indications for a surgical assistant in this surgery were indicated due to complexity of the procedure. Their role included aiding in incision, retraction, holding devices including camera for laparoscopic procedure, and closure of the wound.      Findings: bilateral indirect inguinal hernia    Wound Classification: Class I, I, Clean..    Procedure in detail: The patient was seen and examined in the preoperative holding area.  The risks benefits and alternatives of the procedure were discussed with the patient who wished to proceed with the procedure as described.  The patient was transferred to the operating room placed in supine position and all pressure points were properly padded.  General endotracheal anesthesia was induced and preoperative antibiotics were given per SCIP protocol.  Patient's abdomen was prepped with ChloraPrep and draped in the normal sterile fashion.  A timeout was performed confirming correct patient, correct procedure, and that all necessary equipment was in the room.      A umbilical hernia was identified at the beginning of the case.  We infused local anesthetic over the supraumbilical area and use a combination of blunt electrodissection to free the umbilical stalk from the umbilical hernia.  A 0 Ethibond suture was placed around the fascial opening and a figure-of-eight manner and tied at the end of the case to close the hernia, after closing the hernia at the end of the case the umbilical stalk was secured to the fascia using a 2-0 vicryl.  We then used the hernia defect to gain access to the abdomen  with a robotic port.  Pneumoperitoneum was then achieved and maintained at 15 mmHg carbon dioxide throughout the entirety of the case.  Under direct visualization a right and left working port replaced with a 8 mm robotic port after infusing local anesthetic. the robot was then docked.  We began by identifying an area approximately 8 cm from the inguinal hernia and the peritoneum was sharply incised.  Using combination of blunt, electrocautery, and sharp dissection we were able to dissect the peritoneal flap down to the inguinal canal.  Careful dissection was completed medially to identify the pubic symphysis and carried this down to the obturator canal.  The corona mortise was identified and preserved.  We then continued dissection laterally until we encountered the psoas muscle ensuring that we maintain the iliopubic tract against the abdominal wall. An indirect hernia sac was identified.  Careful dissection was used to free the indirect inguinal hernia sac ensuring that the gonadal structures were carefully identified and preserved throughout the dissection of the hernia sac.  We carried our dissection down until the peritoneum was well below our area of mesh placement.  Any identified cord lipoma was carefully dissected free and reduced into the preperitoneal space..     The peritoneal flap was then extended to the other side and the dissection was completed in a similar fashion. Two 15 x 10 pro- hernia meshes were selected and introduced.  These were then laid into the dissected peritoneal flap ensuring good coverage medially down the pubic symphysis over the midline covering the  space as well as good overlap over the inguinal canal anatomy on both sides.  The peritoneal flap was manipulated to ensure that the mesh did not move.    The peritoneal flap was then closed with a 2-0 strata fix suture.  The perineal flap was then carefully inspected and any identified rents were closed carefully using  the same 2-0 strata fix suture.  The robot was then undocked and the ports were carefully removed.  Skin was then closed with 4-0 Monocryl in a subcuticular fashion and Dermabond was placed over the wounds.    The patient was awakened from general anesthetic, and was taken to the recovery room in stable condition.    Sponge and needle counts were correct at the end of the case.     Specimen: none    EBL: 20mL    Dispo: stable, extubated, to PACU    Shahid Zaragoza M.D.  [facility]  [phone]

## 2021-09-20 NOTE — ANESTHESIA POSTPROCEDURE EVALUATION
Patient: Lay Barr    Procedure Summary     Date: 09/20/21 Room / Location:  OR  / SURGERY [facility]    Anesthesia Start: 0729 Anesthesia Stop: 0909    Procedure: REPAIR, HERNIA, INGUINAL, ROBOT-ASSISTED, USING DA TRACY XI - bilateral WITH MESH (Bilateral Groin) Diagnosis:       Left inguinal hernia      (LEFT INGUINAL HERNIA)    Surgeons: Shahid Zaragoza M.D. Responsible Provider: Fam Irvin M.D.    Anesthesia Type: general ASA Status: 2          Final Anesthesia Type: general  Last vitals  BP   Blood Pressure: 143/92    Temp   36.1 °C (97 °F)    Pulse   64   Resp   16    SpO2   98 %      Anesthesia Post Evaluation    Patient location during evaluation: PACU  Patient participation: complete - patient participated  Level of consciousness: awake and alert    Airway patency: patent  Anesthetic complications: no  Cardiovascular status: hemodynamically stable  Respiratory status: acceptable  Hydration status: euvolemic    PONV: none          There were no known complications for this encounter.     Nurse Pain Score: 6 (NPRS)

## 2021-09-20 NOTE — OR NURSING
Pt discharged to the care of her significant other following an uneventful stay in the discharge area.

## 2022-02-10 ENCOUNTER — OFFICE VISIT (OUTPATIENT)
Dept: MEDICAL GROUP | Facility: PHYSICIAN GROUP | Age: 37
End: 2022-02-10
Payer: COMMERCIAL

## 2022-02-10 VITALS
SYSTOLIC BLOOD PRESSURE: 114 MMHG | OXYGEN SATURATION: 98 % | HEIGHT: 66 IN | WEIGHT: 140.38 LBS | TEMPERATURE: 98.7 F | RESPIRATION RATE: 20 BRPM | BODY MASS INDEX: 22.56 KG/M2 | DIASTOLIC BLOOD PRESSURE: 70 MMHG | HEART RATE: 90 BPM

## 2022-02-10 DIAGNOSIS — Z48.02 VISIT FOR SUTURE REMOVAL: ICD-10-CM

## 2022-02-10 PROBLEM — K40.90 LEFT INGUINAL HERNIA: Status: ACTIVE | Noted: 2021-08-13

## 2022-02-10 PROCEDURE — 99213 OFFICE O/P EST LOW 20 MIN: CPT | Performed by: NURSE PRACTITIONER

## 2022-02-10 RX ORDER — METRONIDAZOLE 7.5 MG/G
GEL VAGINAL
COMMUNITY
Start: 2022-01-25 | End: 2023-02-02

## 2022-02-10 ASSESSMENT — PATIENT HEALTH QUESTIONNAIRE - PHQ9: CLINICAL INTERPRETATION OF PHQ2 SCORE: 0

## 2022-02-10 ASSESSMENT — FIBROSIS 4 INDEX: FIB4 SCORE: 0.59

## 2022-02-10 NOTE — ASSESSMENT & PLAN NOTE
Jennifer is here today to get sutures removed from her right hand that she cut on a wine bottle about 10 days ago. She states that they placed 6 sutures and they are ready to come out. Denies any redness or drainage to the area.

## 2022-02-10 NOTE — PROCEDURES
Suture Removal    Date/Time: 2/10/2022 2:48 PM  Performed by: SALVADOR Scales  Authorized by: SALVADOR Scales   Body area: upper extremity  Location details: right hand  Wound Appearance: clean and pink  Sutures Removed: 6  Post-removal: Steri-Strips applied  Patient tolerance: patient tolerated the procedure well with no immediate complications

## 2023-01-18 ENCOUNTER — HOSPITAL ENCOUNTER (OUTPATIENT)
Facility: MEDICAL CENTER | Age: 38
End: 2023-01-18
Attending: NURSE PRACTITIONER
Payer: COMMERCIAL

## 2023-01-18 PROCEDURE — 81003 URINALYSIS AUTO W/O SCOPE: CPT

## 2023-01-19 LAB
APPEARANCE UR: CLEAR
BILIRUB UR QL STRIP.AUTO: NEGATIVE
COLOR UR: YELLOW
GLUCOSE UR STRIP.AUTO-MCNC: NEGATIVE MG/DL
KETONES UR STRIP.AUTO-MCNC: NEGATIVE MG/DL
LEUKOCYTE ESTERASE UR QL STRIP.AUTO: NEGATIVE
MICRO URNS: NORMAL
NITRITE UR QL STRIP.AUTO: NEGATIVE
PH UR STRIP.AUTO: 6.5 [PH] (ref 5–8)
PROT UR QL STRIP: NEGATIVE MG/DL
RBC UR QL AUTO: NEGATIVE
SP GR UR STRIP.AUTO: 1.02
UROBILINOGEN UR STRIP.AUTO-MCNC: 0.2 MG/DL

## 2023-01-23 ENCOUNTER — HOSPITAL ENCOUNTER (OUTPATIENT)
Dept: LAB | Facility: MEDICAL CENTER | Age: 38
End: 2023-01-23
Attending: OBSTETRICS & GYNECOLOGY
Payer: COMMERCIAL

## 2023-01-23 LAB
BILIRUB CONJ SERPL-MCNC: <0.2 MG/DL (ref 0.1–0.5)
BILIRUB INDIRECT SERPL-MCNC: NORMAL MG/DL (ref 0–1)
BILIRUB SERPL-MCNC: 0.9 MG/DL (ref 0.1–1.5)
T3FREE SERPL-MCNC: 2.5 PG/ML (ref 2–4.4)
T4 FREE SERPL-MCNC: 1.11 NG/DL (ref 0.93–1.7)
THYROPEROXIDASE AB SERPL-ACNC: <9 IU/ML (ref 0–9)
TSH SERPL DL<=0.005 MIU/L-ACNC: 0.9 UIU/ML (ref 0.38–5.33)

## 2023-01-23 PROCEDURE — 36415 COLL VENOUS BLD VENIPUNCTURE: CPT

## 2023-01-23 PROCEDURE — 84482 T3 REVERSE: CPT

## 2023-01-23 PROCEDURE — 84443 ASSAY THYROID STIM HORMONE: CPT

## 2023-01-23 PROCEDURE — 84481 FREE ASSAY (FT-3): CPT

## 2023-01-23 PROCEDURE — 84439 ASSAY OF FREE THYROXINE: CPT

## 2023-01-23 PROCEDURE — 86376 MICROSOMAL ANTIBODY EACH: CPT

## 2023-01-23 PROCEDURE — 82247 BILIRUBIN TOTAL: CPT

## 2023-01-23 PROCEDURE — 82248 BILIRUBIN DIRECT: CPT

## 2023-01-27 LAB — T3REVERSE SERPL-MCNC: 14 NG/DL (ref 9–27)

## 2023-01-30 SDOH — ECONOMIC STABILITY: INCOME INSECURITY: IN THE LAST 12 MONTHS, WAS THERE A TIME WHEN YOU WERE NOT ABLE TO PAY THE MORTGAGE OR RENT ON TIME?: YES

## 2023-01-30 SDOH — ECONOMIC STABILITY: TRANSPORTATION INSECURITY
IN THE PAST 12 MONTHS, HAS LACK OF RELIABLE TRANSPORTATION KEPT YOU FROM MEDICAL APPOINTMENTS, MEETINGS, WORK OR FROM GETTING THINGS NEEDED FOR DAILY LIVING?: NO

## 2023-01-30 SDOH — HEALTH STABILITY: MENTAL HEALTH
STRESS IS WHEN SOMEONE FEELS TENSE, NERVOUS, ANXIOUS, OR CAN'T SLEEP AT NIGHT BECAUSE THEIR MIND IS TROUBLED. HOW STRESSED ARE YOU?: VERY MUCH

## 2023-01-30 SDOH — ECONOMIC STABILITY: FOOD INSECURITY: WITHIN THE PAST 12 MONTHS, THE FOOD YOU BOUGHT JUST DIDN'T LAST AND YOU DIDN'T HAVE MONEY TO GET MORE.: NEVER TRUE

## 2023-01-30 SDOH — HEALTH STABILITY: PHYSICAL HEALTH: ON AVERAGE, HOW MANY DAYS PER WEEK DO YOU ENGAGE IN MODERATE TO STRENUOUS EXERCISE (LIKE A BRISK WALK)?: 7 DAYS

## 2023-01-30 SDOH — ECONOMIC STABILITY: HOUSING INSECURITY
IN THE LAST 12 MONTHS, WAS THERE A TIME WHEN YOU DID NOT HAVE A STEADY PLACE TO SLEEP OR SLEPT IN A SHELTER (INCLUDING NOW)?: NO

## 2023-01-30 SDOH — HEALTH STABILITY: PHYSICAL HEALTH: ON AVERAGE, HOW MANY MINUTES DO YOU ENGAGE IN EXERCISE AT THIS LEVEL?: 30 MIN

## 2023-01-30 SDOH — ECONOMIC STABILITY: INCOME INSECURITY: HOW HARD IS IT FOR YOU TO PAY FOR THE VERY BASICS LIKE FOOD, HOUSING, MEDICAL CARE, AND HEATING?: SOMEWHAT HARD

## 2023-01-30 SDOH — ECONOMIC STABILITY: HOUSING INSECURITY: IN THE LAST 12 MONTHS, HOW MANY PLACES HAVE YOU LIVED?: 1

## 2023-01-30 SDOH — ECONOMIC STABILITY: TRANSPORTATION INSECURITY
IN THE PAST 12 MONTHS, HAS THE LACK OF TRANSPORTATION KEPT YOU FROM MEDICAL APPOINTMENTS OR FROM GETTING MEDICATIONS?: NO

## 2023-01-30 SDOH — ECONOMIC STABILITY: FOOD INSECURITY: WITHIN THE PAST 12 MONTHS, YOU WORRIED THAT YOUR FOOD WOULD RUN OUT BEFORE YOU GOT MONEY TO BUY MORE.: NEVER TRUE

## 2023-01-30 SDOH — ECONOMIC STABILITY: TRANSPORTATION INSECURITY
IN THE PAST 12 MONTHS, HAS LACK OF TRANSPORTATION KEPT YOU FROM MEETINGS, WORK, OR FROM GETTING THINGS NEEDED FOR DAILY LIVING?: NO

## 2023-01-30 SDOH — ECONOMIC STABILITY: HOUSING INSECURITY
IN THE LAST 12 MONTHS, WAS THERE A TIME WHEN YOU DID NOT HAVE A STEADY PLACE TO SLEEP OR SLEPT IN A SHELTER (INCLUDING NOW)?: YES

## 2023-01-30 ASSESSMENT — SOCIAL DETERMINANTS OF HEALTH (SDOH)
HOW OFTEN DO YOU ATTEND CHURCH OR RELIGIOUS SERVICES?: MORE THAN 4 TIMES PER YEAR
WITHIN THE PAST 12 MONTHS, YOU WORRIED THAT YOUR FOOD WOULD RUN OUT BEFORE YOU GOT THE MONEY TO BUY MORE: NEVER TRUE
HOW MANY DRINKS CONTAINING ALCOHOL DO YOU HAVE ON A TYPICAL DAY WHEN YOU ARE DRINKING: 1 OR 2
DO YOU BELONG TO ANY CLUBS OR ORGANIZATIONS SUCH AS CHURCH GROUPS UNIONS, FRATERNAL OR ATHLETIC GROUPS, OR SCHOOL GROUPS?: YES
IN A TYPICAL WEEK, HOW MANY TIMES DO YOU TALK ON THE PHONE WITH FAMILY, FRIENDS, OR NEIGHBORS?: MORE THAN THREE TIMES A WEEK
HOW HARD IS IT FOR YOU TO PAY FOR THE VERY BASICS LIKE FOOD, HOUSING, MEDICAL CARE, AND HEATING?: SOMEWHAT HARD
HOW OFTEN DO YOU HAVE SIX OR MORE DRINKS ON ONE OCCASION: NEVER
HOW OFTEN DO YOU ATTEND CHURCH OR RELIGIOUS SERVICES?: MORE THAN 4 TIMES PER YEAR
HOW OFTEN DO YOU HAVE A DRINK CONTAINING ALCOHOL: MONTHLY OR LESS
HOW OFTEN DO YOU GET TOGETHER WITH FRIENDS OR RELATIVES?: ONCE A WEEK
HOW OFTEN DO YOU ATTENT MEETINGS OF THE CLUB OR ORGANIZATION YOU BELONG TO?: MORE THAN 4 TIMES PER YEAR
HOW OFTEN DO YOU GET TOGETHER WITH FRIENDS OR RELATIVES?: ONCE A WEEK
DO YOU BELONG TO ANY CLUBS OR ORGANIZATIONS SUCH AS CHURCH GROUPS UNIONS, FRATERNAL OR ATHLETIC GROUPS, OR SCHOOL GROUPS?: YES
HOW OFTEN DO YOU ATTENT MEETINGS OF THE CLUB OR ORGANIZATION YOU BELONG TO?: MORE THAN 4 TIMES PER YEAR
IN A TYPICAL WEEK, HOW MANY TIMES DO YOU TALK ON THE PHONE WITH FAMILY, FRIENDS, OR NEIGHBORS?: MORE THAN THREE TIMES A WEEK

## 2023-01-30 ASSESSMENT — LIFESTYLE VARIABLES
HOW OFTEN DO YOU HAVE SIX OR MORE DRINKS ON ONE OCCASION: NEVER
HOW MANY STANDARD DRINKS CONTAINING ALCOHOL DO YOU HAVE ON A TYPICAL DAY: 1 OR 2
HOW OFTEN DO YOU HAVE A DRINK CONTAINING ALCOHOL: MONTHLY OR LESS
SKIP TO QUESTIONS 9-10: 1
AUDIT-C TOTAL SCORE: 1

## 2023-02-02 ENCOUNTER — OFFICE VISIT (OUTPATIENT)
Dept: MEDICAL GROUP | Facility: PHYSICIAN GROUP | Age: 38
End: 2023-02-02
Payer: COMMERCIAL

## 2023-02-02 VITALS
TEMPERATURE: 98.6 F | WEIGHT: 151 LBS | HEIGHT: 66 IN | SYSTOLIC BLOOD PRESSURE: 120 MMHG | DIASTOLIC BLOOD PRESSURE: 84 MMHG | BODY MASS INDEX: 24.27 KG/M2 | RESPIRATION RATE: 20 BRPM | HEART RATE: 62 BPM | OXYGEN SATURATION: 100 %

## 2023-02-02 DIAGNOSIS — E78.00 ELEVATED LDL CHOLESTEROL LEVEL: ICD-10-CM

## 2023-02-02 DIAGNOSIS — E55.9 VITAMIN D DEFICIENCY: ICD-10-CM

## 2023-02-02 DIAGNOSIS — Z13.0 SCREENING FOR DEFICIENCY ANEMIA: ICD-10-CM

## 2023-02-02 DIAGNOSIS — Z00.00 WELLNESS EXAMINATION: ICD-10-CM

## 2023-02-02 DIAGNOSIS — Z13.1 ENCOUNTER FOR SCREENING FOR DIABETES MELLITUS: ICD-10-CM

## 2023-02-02 PROCEDURE — 99395 PREV VISIT EST AGE 18-39: CPT | Performed by: NURSE PRACTITIONER

## 2023-02-02 ASSESSMENT — FIBROSIS 4 INDEX: FIB4 SCORE: 0.6

## 2023-02-02 ASSESSMENT — PATIENT HEALTH QUESTIONNAIRE - PHQ9: CLINICAL INTERPRETATION OF PHQ2 SCORE: 0

## 2023-02-02 NOTE — PROGRESS NOTES
Subjective  Chief Complaint  Annual Wellness Exam    History of Present Illness  Lay Barr is a 37 y.o. female who presents for annual exam. She is feeling well and denies any complaints.    Ob-Gyn/ History:    Patient has GYN provider: Yes    Health Maintenance  Advanced directive: NA   Osteoporosis Screen/ DEXA: NA   PT/vit D for falls prevention: NA   Cholesterol Screening: Ordered   Diabetes Screening: Ordered   Aspirin Use: No    Diet: Overall healthy   Exercise: Works out 3-5 times a week.   Substance Abuse: Denies   Safe in relationship.  Seat belts, bike helmet, gun safety discussed.  Sun protection used.    Cancer screening  Colorectal Cancer Screening: NA    Lung Cancer Screening: NA    Cervical Cancer Screening: 10/2020   Breast Cancer Screening: NA     Infectious disease screening/Immunizations  --STI Screening: Refused   --Practices safe sex.  --HIV Screening: Refused   --Hepatitis C Screening: Refused  --Immunizations:    Influenza: 9/2022    HPV:  NA    Tetanus: 1/2022    Shingles: NA   Pneumococcal : NA     Other immunizations: Up to date on all other immunizations.     Past Medical History    Allergies: Coriander oil, Pineapple, and Shellfish allergy  Past Medical History:   Diagnosis Date    Allergy      Past Surgical History:   Procedure Laterality Date    INGUINAL HERNIA REPAIR ROBOTIC XI Bilateral 9/20/2021    Procedure: REPAIR, HERNIA, INGUINAL, ROBOT-ASSISTED, USING DA TRACY XI - bilateral WITH MESH;  Surgeon: Shahid Zaragoza M.D.;  Location: SURGERY [facility];  Service: Gen Robotic    ABDOMINAL EXPLORATION      partal stomach removal (age 17)    COLONOSCOPY      cysts removed     Current Outpatient Medications Ordered in Epic   Medication Sig Dispense Refill    Lithium Orotate 5 MG Cap Take  by mouth.       No current Epic-ordered facility-administered medications on file.     Family History:    Family History   Problem Relation Age of Onset    Lung Disease Mother         COPD     "Diabetes Mother     Heart Disease Mother     Hypertension Mother     Stroke Father     Hyperlipidemia Father     Hypertension Father     Thyroid Father       Personal/Social History:    Social History     Tobacco Use    Smoking status: Never    Smokeless tobacco: Never   Vaping Use    Vaping Use: Never used   Substance Use Topics    Alcohol use: Not Currently     Comment: Quit 7 months ago     Drug use: Yes     Types: Marijuana, Oral     Comment: daily use      Social History     Social History Narrative    Not on file     Review of Systems   General: Negative for fever/chills and expected weight change.  Eyes:  Negative for vision changes, eye pain.  ENT:  Negative for hearing changes, ear pain, congestion, sore throat, and neck pain.   Respiratory:  Negative for cough and dyspnea.    Cardiovascular:  Negative for chest pain and palpitations.  Gastrointestinal:  Negative for nausea/vomiting, changes in bowel habits, and abdominal pain.   Genitourinary:  Negative for dysuria and hematuria.   Musculoskeletal:  Negative for myalgias.   Skin:  Negative for rash.   Heme/Lymph:  Does not bruise/bleed easily.   Neurological:  Negative for numbness/tingling and headaches.   Psychiatric/Behavioral:  Negative for depression.  The patient is not nervous/anxious.      Objective  Physical Exam:   /84 (BP Location: Left arm, Patient Position: Sitting, BP Cuff Size: Adult)   Pulse 62   Temp 37 °C (98.6 °F) (Temporal)   Resp 20   Ht 1.676 m (5' 6\")   Wt 68.5 kg (151 lb)   SpO2 100%  Body mass index is 24.37 kg/m².  Wt Readings from Last 4 Encounters:   02/02/23 68.5 kg (151 lb)   02/10/22 63.7 kg (140 lb 6 oz)   09/20/21 66 kg (145 lb 8.1 oz)   06/01/21 66.2 kg (146 lb)     Constitutional: Well-developed and well-nourished. Not diaphoretic. No distress.   Skin: Skin is warm and dry. No rash noted.  Head: Atraumatic without lesions.  Eyes: Conjunctivae and extraocular motions are normal. Pupils are equal, round, and " reactive to light. No scleral icterus.   Ears:  External ears unremarkable. Tympanic membranes clear and intact.  Nose: Nares patent. Septum midline. Turbinates without erythema nor edema. No discharge.   Mouth/Throat: Dentition is normal. Tongue normal. Oropharynx is clear and moist. Posterior pharynx without erythema or exudates.  Neck: Supple, trachea midline. Normal range of motion. No thyromegaly present. No lymphadenopathy--cervical or supraclavicular.  Cardiovascular: Regular rate and rhythm, S1 and S2 without murmur, rubs, or gallops.  Lungs: Normal inspiratory effort, CTA bilaterally, no wheezes/rhonchi/rales  Abdomen: Soft, non tender, and without distention. Active bowel sounds in all four quadrants. No rebound, guarding, masses or HSM.  Extremities: No cyanosis, clubbing, erythema, nor edema. Distal pulses intact and symmetric.   Musculoskeletal: All major joints AROM full in all directions without pain.  Neurologic: Grossly intact.  DTRs 2+/3 and symmetric.  No cranial nerve deficit.  Sensation intact.   Psychiatric:  Behavior, mood, and affect are appropriate.    A chaperone was offered to the patient during today's exam. Chaperone name: TORRES Bey student was present.    Assessment/Plan  1. Wellness examination        2. Vitamin D deficiency  VITAMIN D,25 HYDROXY (DEFICIENCY)      3. Elevated LDL cholesterol level  Lipid Profile      4. Screening for deficiency anemia  CBC WITH DIFFERENTIAL      5. Encounter for screening for diabetes mellitus  Comp Metabolic Panel          Health Maintenance: Completed  Labs per orders.  Immunizations per orders.  Patient counseled about skin care, diet, supplements, prenatal vitamins, safe sex and exercise.    Follow-up: Return in about 1 year (around 2/2/2024), or if symptoms worsen or fail to improve.    Discussed that the patient carries some responsibility in management of their health care.    Please note that this dictation was created using voice recognition  software. I have made every reasonable attempt to correct obvious errors, but I expect that there are errors of grammar and possibly content that I did not discover before finalizing the note.    DALIA Zurita  [facility]

## 2023-02-16 ENCOUNTER — OFFICE VISIT (OUTPATIENT)
Dept: DERMATOLOGY | Facility: IMAGING CENTER | Age: 38
End: 2023-02-16
Payer: COMMERCIAL

## 2023-02-16 DIAGNOSIS — L81.4 LENTIGINES: ICD-10-CM

## 2023-02-16 DIAGNOSIS — L73.8 SEBACEOUS HYPERPLASIA: ICD-10-CM

## 2023-02-16 DIAGNOSIS — Z12.83 SKIN CANCER SCREENING: ICD-10-CM

## 2023-02-16 DIAGNOSIS — D22.9 MULTIPLE NEVI: ICD-10-CM

## 2023-02-16 DIAGNOSIS — L82.1 SK (SEBORRHEIC KERATOSIS): ICD-10-CM

## 2023-02-16 DIAGNOSIS — D18.01 CHERRY ANGIOMA: ICD-10-CM

## 2023-02-16 PROCEDURE — 99213 OFFICE O/P EST LOW 20 MIN: CPT | Performed by: NURSE PRACTITIONER

## 2023-02-16 NOTE — PROGRESS NOTES
DERMATOLOGY NOTE  NEW VISIT       Chief complaint: Establish Care (JUAN)  Pt here today for JUAN. Has spots on face to evaluate. Possible wart on right hand      History of skin cancer: No  History of precancers/actinic keratoses: No  History of biopsies:Yes, Details: back, benign   History of blistering/severe sunburns:Yes, Details: Childhood  Family history of skin cancer:No  Family history of atypical moles:No      Allergies   Allergen Reactions    Coriander Oil Anaphylaxis    Pineapple Hives    Shellfish Allergy Anaphylaxis        MEDICATIONS:  Medications relevant to specialty reviewed.     REVIEW OF SYSTEMS:   Positive for skin (see HPI)  Negative for fevers and chills       EXAM:  LMP 01/18/2023 Comment: IUD  Constitutional: Well-developed, well-nourished, and in no distress.     A total body skin exam was performed excluding the genitals per patient preference and including the following areas: head (including face), neck, chest, abdomen, groin/buttocks, back, bilateral upper extremities, and bilateral lower extremities with the following pertinent findings listed below and/or in assessment/plan.     -Sun exposed skin of trunk and b/l upper, lower extremities and face with few scattered clinically benign light brown reticulated macules all of which were morphologically similar and none of which were suspicious for skin cancer today on exam    -Very few scattered 1-3mm bright red macules and thin papules on the trunk and extremities    -Multiple tan light brown medium brown skin-colored macules papules scattered over the trunk, face extremities    -few scattered sebaceous hyperplasia on face     -Several tan stuck-on waxy papules scattered on the rt clavicle    IMPRESSION / PLAN:    1. Lentigines  - Benign-appearing nature of lesions discussed during exam.   - Advised to continue to monitor for any return to clinic for new or concerning changes.      2. Cherry angioma  - Benign-appearing nature of lesions  discussed during exam.   - Advised to continue to monitor for any return to clinic for new or concerning changes.      3. Multiple nevi  - Benign-appearing nature of lesions discussed during exam.   - Advised to continue to monitor for any return to clinic for new or concerning changes.  - ABCDE's of melanoma discussed/handout given      4. Sebaceous hyperplasia  - Benign-appearing nature of lesions discussed during exam.   - Advised to continue to monitor for any return to clinic for new or concerning changes.      5. SK (seborrheic keratosis)  - Benign-appearing nature of lesions discussed during exam.   - Advised to continue to monitor for any return to clinic for new or concerning changes.      6. Skin cancer screening  Skin cancer education  discussed importance of sun protective clothing, eyewear in addition to the use of broad spectrum sunscreen with SPF 30 or greater, as well as need for reapplication ~every 2 hours when exposed to UVR  discussed importance following up for any new or changing lesions as noted in handout given, but every 12 months exams in clinic in the setting of dermatologic history  ABCDE's of melanoma discussed/handout given          Please note that this dictation was created using voice recognition software. I have made every reasonable attempt to correct obvious errors, but I expect that there are errors of grammar and possibly content that I did not discover before finalizing the note.      Return to clinic in: Return in about 1 year (around 2/16/2024) for JUAN. and as needed for any new or changing skin lesions.

## 2023-04-14 ENCOUNTER — DOCUMENTATION (OUTPATIENT)
Dept: HEALTH INFORMATION MANAGEMENT | Facility: OTHER | Age: 38
End: 2023-04-14
Payer: COMMERCIAL

## 2023-04-14 ENCOUNTER — PATIENT MESSAGE (OUTPATIENT)
Dept: HEALTH INFORMATION MANAGEMENT | Facility: OTHER | Age: 38
End: 2023-04-14

## 2023-06-05 ENCOUNTER — TELEPHONE (OUTPATIENT)
Dept: HEALTH INFORMATION MANAGEMENT | Facility: OTHER | Age: 38
End: 2023-06-05
Payer: COMMERCIAL

## 2023-12-17 ENCOUNTER — HOSPITAL ENCOUNTER (OUTPATIENT)
Dept: RADIOLOGY | Facility: MEDICAL CENTER | Age: 38
End: 2023-12-17
Attending: NURSE PRACTITIONER
Payer: COMMERCIAL

## 2023-12-17 ENCOUNTER — OFFICE VISIT (OUTPATIENT)
Dept: URGENT CARE | Facility: PHYSICIAN GROUP | Age: 38
End: 2023-12-17
Payer: COMMERCIAL

## 2023-12-17 VITALS
BODY MASS INDEX: 24.75 KG/M2 | WEIGHT: 154 LBS | SYSTOLIC BLOOD PRESSURE: 158 MMHG | OXYGEN SATURATION: 95 % | DIASTOLIC BLOOD PRESSURE: 100 MMHG | RESPIRATION RATE: 20 BRPM | HEIGHT: 66 IN | HEART RATE: 71 BPM | TEMPERATURE: 98.1 F

## 2023-12-17 DIAGNOSIS — R05.1 ACUTE COUGH: ICD-10-CM

## 2023-12-17 DIAGNOSIS — R07.9 CHEST PAIN, UNSPECIFIED TYPE: ICD-10-CM

## 2023-12-17 DIAGNOSIS — J02.9 PHARYNGITIS, UNSPECIFIED ETIOLOGY: ICD-10-CM

## 2023-12-17 DIAGNOSIS — J10.1 INFLUENZA B: ICD-10-CM

## 2023-12-17 DIAGNOSIS — K21.00 GASTROESOPHAGEAL REFLUX DISEASE WITH ESOPHAGITIS, UNSPECIFIED WHETHER HEMORRHAGE: ICD-10-CM

## 2023-12-17 LAB
FLUAV RNA SPEC QL NAA+PROBE: NEGATIVE
FLUBV RNA SPEC QL NAA+PROBE: POSITIVE
RSV RNA SPEC QL NAA+PROBE: NEGATIVE
S PYO DNA SPEC NAA+PROBE: NOT DETECTED
SARS-COV-2 RNA RESP QL NAA+PROBE: NEGATIVE

## 2023-12-17 PROCEDURE — 87651 STREP A DNA AMP PROBE: CPT | Performed by: NURSE PRACTITIONER

## 2023-12-17 PROCEDURE — 3080F DIAST BP >= 90 MM HG: CPT | Performed by: NURSE PRACTITIONER

## 2023-12-17 PROCEDURE — 71046 X-RAY EXAM CHEST 2 VIEWS: CPT

## 2023-12-17 PROCEDURE — 3077F SYST BP >= 140 MM HG: CPT | Performed by: NURSE PRACTITIONER

## 2023-12-17 PROCEDURE — 0241U POCT CEPHEID COV-2, FLU A/B, RSV - PCR: CPT | Performed by: NURSE PRACTITIONER

## 2023-12-17 PROCEDURE — 99214 OFFICE O/P EST MOD 30 MIN: CPT | Performed by: NURSE PRACTITIONER

## 2023-12-17 PROCEDURE — 93000 ELECTROCARDIOGRAM COMPLETE: CPT | Performed by: NURSE PRACTITIONER

## 2023-12-17 RX ORDER — DEXTROMETHORPHAN HYDROBROMIDE AND PROMETHAZINE HYDROCHLORIDE 15; 6.25 MG/5ML; MG/5ML
5 SYRUP ORAL EVERY 4 HOURS PRN
Qty: 120 ML | Refills: 0 | Status: SHIPPED | OUTPATIENT
Start: 2023-12-17

## 2023-12-17 RX ORDER — PANTOPRAZOLE SODIUM 20 MG/1
20 TABLET, DELAYED RELEASE ORAL DAILY
Qty: 30 TABLET | Refills: 0 | Status: SHIPPED | OUTPATIENT
Start: 2023-12-17

## 2023-12-17 ASSESSMENT — ENCOUNTER SYMPTOMS
COUGH: 1
DIAPHORESIS: 0
CHILLS: 1
FEVER: 1
SENSORY CHANGE: 0
NAUSEA: 1
SORE THROAT: 1
ABDOMINAL PAIN: 1
TINGLING: 1
VOMITING: 0
SHORTNESS OF BREATH: 0
DIARRHEA: 1
FOCAL WEAKNESS: 0
WEAKNESS: 0

## 2023-12-17 ASSESSMENT — VISUAL ACUITY: OU: 1

## 2023-12-17 NOTE — PROGRESS NOTES
Subjective:     Lay Barr is a 38 y.o. female who presents for Chest Pain (Pt states experiencing numbness in fingers and face/cheeks X 3 hours. Chest ache X last night, states uncomfortable feeling when swallowing food in chest and throat. Pt appears shaken and shivering.)       Chest Pain   This is a new problem. Associated symptoms include abdominal pain, a cough, a fever, malaise/fatigue and nausea. Pertinent negatives include no diaphoresis, shortness of breath, vomiting or weakness.   Cough  This is a new problem. Associated symptoms include chest pain, chills, a fever and a sore throat. Pertinent negatives include no shortness of breath.   Diarrhea   This is a new problem. Associated symptoms include abdominal pain, chills, coughing and a fever. Pertinent negatives include no vomiting.     3 days ago, patient developed a cough. Last night, after swallowing food, felt pain at the right side of her chest near midline. Quickly resolved. No current chest pain.    3 hours ago, felt feverish and tingling in her hands face. Took Tylenol.    Has also had diarrhea, chills, heartburn, and sore throat. Reports history of acid reflux.    Review of Systems   Constitutional:  Positive for chills, fever and malaise/fatigue. Negative for diaphoresis.   HENT:  Positive for sore throat.    Respiratory:  Positive for cough. Negative for shortness of breath.    Cardiovascular:  Positive for chest pain.   Gastrointestinal:  Positive for abdominal pain, diarrhea and nausea. Negative for vomiting.   Genitourinary:  Negative for dysuria, frequency and urgency.   Neurological:  Positive for tingling. Negative for sensory change, focal weakness and weakness.   All other systems reviewed and are negative.    Refer to HPI for additional details.    During this visit, appropriate PPE was worn, and hand hygiene was performed.    PMH:  has a past medical history of Allergy.    MEDS:   Current Outpatient Medications:      "promethazine-dextromethorphan (PROMETHAZINE-DM) 6.25-15 MG/5ML syrup, Take 5 mL by mouth every four hours as needed for Cough., Disp: 120 mL, Rfl: 0    pantoprazole (PROTONIX) 20 MG tablet, Take 1 Tablet by mouth every day., Disp: 30 Tablet, Rfl: 0    Lithium Orotate 5 MG Cap, Take  by mouth., Disp: , Rfl:     ALLERGIES:   Allergies   Allergen Reactions    Coriander Oil Anaphylaxis    Pineapple Hives    Shellfish Allergy Anaphylaxis     SURGHX:   Past Surgical History:   Procedure Laterality Date    INGUINAL HERNIA REPAIR ROBOTIC XI Bilateral 9/20/2021    Procedure: REPAIR, HERNIA, INGUINAL, ROBOT-ASSISTED, USING [employer] XI - bilateral WITH MESH;  Surgeon: Shahid Zaragoza M.D.;  Location: SURGERY [facility];  Service: Gen Robotic    ABDOMINAL EXPLORATION      partal stomach removal (age 17)    COLONOSCOPY      cysts removed     SOCHX:  reports that she has never smoked. She has never used smokeless tobacco. She reports that she does not currently use alcohol. She reports current drug use. Drugs: Marijuana and Oral.    FH: Per HPI as applicable/pertinent.      Objective:     BP (!) 158/100 (BP Location: Left arm, Patient Position: Sitting, BP Cuff Size: Adult)   Pulse 71   Temp 36.7 °C (98.1 °F) (Temporal)   Resp 20   Ht 1.676 m (5' 6\")   Wt 69.9 kg (154 lb)   SpO2 95%   BMI 24.86 kg/m²     Physical Exam  Nursing note reviewed.   Constitutional:       General: She is not in acute distress.     Appearance: She is well-developed. She is ill-appearing. She is not toxic-appearing or diaphoretic.   HENT:      Head: Normocephalic.      Nose: Nose normal.      Mouth/Throat:      Mouth: Mucous membranes are moist.      Pharynx: Uvula midline. Pharyngeal swelling and posterior oropharyngeal erythema present.   Eyes:      General: Vision grossly intact.      Extraocular Movements: Extraocular movements intact.      Conjunctiva/sclera: Conjunctivae normal.   Cardiovascular:      Rate and Rhythm: Normal rate and regular " rhythm.      Heart sounds: Normal heart sounds.   Pulmonary:      Effort: Pulmonary effort is normal. No respiratory distress.      Breath sounds: Normal breath sounds. No decreased breath sounds.   Abdominal:      General: Bowel sounds are normal. There is no distension.      Palpations: Abdomen is soft.      Tenderness: There is no abdominal tenderness.   Musculoskeletal:         General: No deformity. Normal range of motion.      Cervical back: Normal range of motion.   Skin:     General: Skin is warm and dry.      Coloration: Skin is not pale.   Neurological:      Mental Status: She is alert and oriented to person, place, and time.      Motor: No weakness.   Psychiatric:         Behavior: Behavior normal. Behavior is cooperative.     Chest x-ray:    Details    Reading Physician Reading Date Result Priority   Yadiel Puga M.D.  [phone] 12/17/2023      Narrative & Impression     12/17/2023 11:21 AM     HISTORY/REASON FOR EXAM:  Cough.    TECHNIQUE/EXAM DESCRIPTION AND NUMBER OF VIEWS:  Two views of the chest.     COMPARISON:  None.     FINDINGS:     Cardiomediastinal silhouette is normal.     No focal consolidation, pleural effusion, pulmonary edema or pneumothorax.     No acute osseous abnormality.     IMPRESSION:     No acute cardiopulmonary abnormality.           Exam Ended: 12/17/23 11:30 AM Last Resulted: 12/17/23 11:32 AM           Radiology report and images reviewed by myself. Concur with findings.    EKG: sinus rhythm 63, no acute ST abnormalities, no ectopy (no prior comparison)    POCT Cepheid CoV-2, Flu A/B, RSV by PCR: positive flu B    POCT Cepheid Group A Strep by PCR: negative      Assessment/Plan:     1. Influenza B    2. Chest pain, unspecified type  - EKG    3. Gastroesophageal reflux disease with esophagitis, unspecified whether hemorrhage  - pantoprazole (PROTONIX) 20 MG tablet; Take 1 Tablet by mouth every day.  Dispense: 30 Tablet; Refill: 0    4. Acute cough  - POCT CEPHEID COV-2,  FLU A/B, RSV - PCR  - DX-CHEST-2 VIEWS; Future  - promethazine-dextromethorphan (PROMETHAZINE-DM) 6.25-15 MG/5ML syrup; Take 5 mL by mouth every four hours as needed for Cough.  Dispense: 120 mL; Refill: 0    5. Pharyngitis, unspecified etiology  - POCT CEPHEID GROUP A STREP - PCR    Discussed likely self-limiting viral etiology and expected course and duration of illness. SpO2 95%. Lungs clear. CXR normal. EKG normal.     Beyond window for Tamiflu. Emphasize supportive measures, rest, fluids, and OTC medication PRN such as ibuprofen and acetaminophen.    Standard precautions/mask/wash hands.    Monitor. Follow up in 7 days if symptoms do not improve or sooner if symptoms change or worsen. Warning signs reviewed. Immediate return precautions advised.     Differential diagnosis, natural history, supportive care, over-the-counter symptom management per 's instructions, close monitoring, and indications for immediate follow-up discussed.     All questions answered. Patient agrees with the plan of care.    Discharge summary provided via SCM-GL.    Billing note: moderate complexity and moderate risk. Established patient. [address]11. Please refer to LOS tool for details.

## 2024-01-02 ENCOUNTER — OFFICE VISIT (OUTPATIENT)
Dept: URGENT CARE | Facility: PHYSICIAN GROUP | Age: 39
End: 2024-01-02
Payer: COMMERCIAL

## 2024-01-02 VITALS
RESPIRATION RATE: 15 BRPM | WEIGHT: 166 LBS | HEIGHT: 65 IN | OXYGEN SATURATION: 99 % | DIASTOLIC BLOOD PRESSURE: 82 MMHG | SYSTOLIC BLOOD PRESSURE: 128 MMHG | HEART RATE: 65 BPM | BODY MASS INDEX: 27.66 KG/M2 | TEMPERATURE: 97.3 F

## 2024-01-02 DIAGNOSIS — H10.32 ACUTE BACTERIAL CONJUNCTIVITIS OF LEFT EYE: ICD-10-CM

## 2024-01-02 PROCEDURE — 3074F SYST BP LT 130 MM HG: CPT | Performed by: FAMILY MEDICINE

## 2024-01-02 PROCEDURE — 99213 OFFICE O/P EST LOW 20 MIN: CPT | Performed by: FAMILY MEDICINE

## 2024-01-02 PROCEDURE — 3079F DIAST BP 80-89 MM HG: CPT | Performed by: FAMILY MEDICINE

## 2024-01-02 RX ORDER — POLYMYXIN B SULFATE AND TRIMETHOPRIM 1; 10000 MG/ML; [USP'U]/ML
1 SOLUTION OPHTHALMIC 4 TIMES DAILY
Qty: 10 ML | Refills: 0 | Status: SHIPPED | OUTPATIENT
Start: 2024-01-02 | End: 2024-01-09

## 2024-01-02 ASSESSMENT — ENCOUNTER SYMPTOMS
FEVER: 0
EYE REDNESS: 1
CHILLS: 0
VOMITING: 0
NAUSEA: 0
COUGH: 0
MYALGIAS: 0
SHORTNESS OF BREATH: 0
SORE THROAT: 0
EYE DISCHARGE: 1

## 2024-01-02 ASSESSMENT — VISUAL ACUITY: OU: 1

## 2024-01-02 NOTE — LETTER
January 2, 2024         Patient: Lay Barr   YOB: 1985   Date of Visit: 1/2/2024           To Whom it May Concern:    Lay Barr was seen in my clinic on 1/2/2024. She may return to work on 1/4/2024.    If you have any questions or concerns, please don't hesitate to call.        Sincerely,           Rick Yepez M.D.  Electronically Signed

## 2024-01-03 NOTE — PROGRESS NOTES
Subjective:   Lay Barr is a 38 y.o. female who presents for Pink Eye (This morning)        Conjunctivitis  Chronicity: Reports left eye redness, drainage this morning. The current episode started today. The problem occurs constantly. The problem has been unchanged. Pertinent negatives include no chills, coughing, fever, myalgias, nausea, rash, sore throat or vomiting. Associated symptoms comments: Denies wearing contacts. The treatment provided no relief.     PMH:  has a past medical history of Allergy.  MEDS:   Current Outpatient Medications:     polymixin-trimethoprim (POLYTRIM) 36825-5.1 UNIT/ML-% Solution, Administer 1 Drop into the left eye 4 times a day for 7 days., Disp: 10 mL, Rfl: 0    Lithium Orotate 5 MG Cap, Take  by mouth., Disp: , Rfl:     promethazine-dextromethorphan (PROMETHAZINE-DM) 6.25-15 MG/5ML syrup, Take 5 mL by mouth every four hours as needed for Cough. (Patient not taking: Reported on 1/2/2024), Disp: 120 mL, Rfl: 0    pantoprazole (PROTONIX) 20 MG tablet, Take 1 Tablet by mouth every day. (Patient not taking: Reported on 1/2/2024), Disp: 30 Tablet, Rfl: 0  ALLERGIES:   Allergies   Allergen Reactions    Coriander Oil Anaphylaxis    Pineapple Hives    Shellfish Allergy Anaphylaxis     SURGHX:   Past Surgical History:   Procedure Laterality Date    INGUINAL HERNIA REPAIR ROBOTIC XI Bilateral 9/20/2021    Procedure: REPAIR, HERNIA, INGUINAL, ROBOT-ASSISTED, USING DA TRACY XI - bilateral WITH MESH;  Surgeon: Shahid Zaragoza M.D.;  Location: SURGERY [facility];  Service: Gen Robotic    ABDOMINAL EXPLORATION      partal stomach removal (age 17)    COLONOSCOPY      cysts removed     SOCHX:  reports that she has never smoked. She has never used smokeless tobacco. She reports current alcohol use. She reports current drug use. Drugs: Marijuana and Oral.  FH:   Family History   Problem Relation Age of Onset    Lung Disease Mother         COPD    Diabetes Mother     Heart Disease Mother      "Hypertension Mother     Stroke Father     Hyperlipidemia Father     Hypertension Father     Thyroid Father      Review of Systems   Constitutional:  Negative for chills and fever.   HENT:  Negative for sore throat.    Eyes:  Positive for discharge and redness.   Respiratory:  Negative for cough and shortness of breath.    Gastrointestinal:  Negative for nausea and vomiting.   Musculoskeletal:  Negative for myalgias.   Skin:  Negative for rash.        Objective:   /82   Pulse 65   Temp 36.3 °C (97.3 °F) (Temporal)   Resp 15   Ht 1.651 m (5' 5\")   Wt 75.3 kg (166 lb)   SpO2 99%   BMI 27.62 kg/m²   Physical Exam  Vitals and nursing note reviewed.   Constitutional:       General: She is not in acute distress.     Appearance: She is well-developed.   HENT:      Head: Normocephalic and atraumatic.      Right Ear: External ear normal.      Left Ear: External ear normal.      Nose: Nose normal.      Mouth/Throat:      Mouth: Mucous membranes are moist.   Eyes:      General: Vision grossly intact.      Extraocular Movements: Extraocular movements intact.      Conjunctiva/sclera:      Right eye: Right conjunctiva is not injected. No exudate.     Left eye: Left conjunctiva is injected. Exudate present.      Pupils: Pupils are equal, round, and reactive to light.   Cardiovascular:      Rate and Rhythm: Normal rate.   Pulmonary:      Effort: Pulmonary effort is normal. No respiratory distress.      Breath sounds: Normal breath sounds.   Abdominal:      General: There is no distension.   Musculoskeletal:         General: Normal range of motion.   Skin:     General: Skin is warm and dry.   Neurological:      General: No focal deficit present.      Mental Status: She is alert and oriented to person, place, and time. Mental status is at baseline.      Gait: Gait (gait at baseline) normal.   Psychiatric:         Judgment: Judgment normal.           Assessment/Plan:   1. Acute bacterial conjunctivitis of left eye  - " polymixin-trimethoprim (POLYTRIM) 06715-4.1 UNIT/ML-% Solution; Administer 1 Drop into the left eye 4 times a day for 7 days.  Dispense: 10 mL; Refill: 0        Medical Decision Making/Course:  In the course of preparing for this visit with review of the pertinent past medical history, recent and past clinic visits, current medications, and performing chart, immunization, medical history and medication reconciliation, and in the further course of obtaining the current history pertinent to the clinic visit today, performing an exam and evaluation, ordering and independently evaluating labs, tests  , and/or procedures, prescribing any recommended new medications as noted above, providing any pertinent counseling and education and recommending further coordination of care including recommendations for symptomatic and supportive measures and drafting a work excuse letter, at least  12 minutes of total time were spent during this encounter.      Discussed close monitoring, return precautions, and supportive measures of maintaining adequate fluid hydration and caloric intake, relative rest and symptom management as needed for pain and/or fever.    Differential diagnosis, natural history, supportive care, and indications for immediate follow-up discussed.     Advised the patient to follow-up with the primary care physician for recheck, reevaluation, and consideration of further management.    Please note that this dictation was created using voice recognition software. I have worked with consultants from the vendor as well as technical experts from [employer] to optimize the interface. I have made every reasonable attempt to correct obvious errors, but I expect that there are errors of grammar and possibly content that I did not discover before finalizing the note.

## 2024-01-03 NOTE — PATIENT INSTRUCTIONS
Bacterial Conjunctivitis, Adult  Bacterial conjunctivitis is an infection of your conjunctiva. This is the clear membrane that covers the white part of your eye and the inner part of your eyelid. This infection can make your eye:  Red or pink.  Itchy or irritated.  This condition spreads easily from person to person (is contagious) and from one eye to the other eye.  What are the causes?  This condition is caused by germs (bacteria). You may get the infection if you come into close contact with:  A person who has the infection.  Items that have germs on them (are contaminated), such as face towels, contact lens solution, or eye makeup.  What increases the risk?  You are more likely to get this condition if:  You have contact with people who have the infection.  You wear contact lenses.  You have a sinus infection.  You have had a recent eye injury or surgery.  You have a weak body defense system (immune system).  You have dry eyes.  What are the signs or symptoms?    Thick, yellowish discharge from the eye.  Tearing or watery eyes.  Itchy eyes.  Burning feeling in your eyes.  Eye redness.  Swollen eyelids.  Blurred vision.  How is this treated?    Antibiotic eye drops or ointment.  Antibiotic medicine taken by mouth. This is used for infections that do not get better with drops or ointment or that last more than 10 days.  Cool, wet cloths placed on the eyes.  Artificial tears used 2-6 times a day.  Follow these instructions at home:  Medicines  Take or apply your antibiotic medicine as told by your doctor. Do not stop using it even if you start to feel better.  Take or apply over-the-counter and prescription medicines only as told by your doctor.  Do not touch your eyelid with the eye-drop bottle or the ointment tube.  Managing discomfort  Wipe any fluid from your eye with a warm, wet washcloth or a cotton ball.  Place a clean, cool, wet cloth on your eye. Do this for 10-20 minutes, 3-4 times a day.  General  instructions  Do not wear contacts until the infection is gone. Wear glasses until your doctor says it is okay to wear contacts again.  Do not wear eye makeup until the infection is gone. Throw away old eye makeup.  Change or wash your pillowcase every day.  Do not share towels or washcloths.  Wash your hands often with soap and water for at least 20 seconds and especially before touching your face or eyes. Use paper towels to dry your hands.  Do not touch or rub your eyes.  Do not drive or use heavy machinery if your vision is blurred.  Contact a doctor if:  You have a fever.  You do not get better after 10 days.  Get help right away if:  You have a fever and your symptoms get worse all of a sudden.  You have very bad pain when you move your eye.  Your face:  Hurts.  Is red.  Is swollen.  You have sudden loss of vision.  Summary  Bacterial conjunctivitis is an infection of your conjunctiva.  This infection spreads easily from person to person.  Wash your hands often with soap and water for at least 20 seconds and especially before touching your face or eyes. Use paper towels to dry your hands.  Take or apply your antibiotic medicine as told by your doctor.  Contact a doctor if you have a fever or you do not get better after 10 days.  This information is not intended to replace advice given to you by your health care provider. Make sure you discuss any questions you have with your health care provider.  Document Revised: 03/30/2022 Document Reviewed: 03/30/2022  1[employer] Patient Education © 2023 [employer]

## 2024-01-26 ENCOUNTER — HOSPITAL ENCOUNTER (OUTPATIENT)
Dept: LAB | Facility: MEDICAL CENTER | Age: 39
End: 2024-01-26
Attending: NURSE PRACTITIONER
Payer: COMMERCIAL

## 2024-01-26 LAB
ALBUMIN SERPL BCP-MCNC: 4.2 G/DL (ref 3.2–4.9)
ALBUMIN/GLOB SERPL: 1.4 G/DL
ALP SERPL-CCNC: 37 U/L (ref 30–99)
ALT SERPL-CCNC: 16 U/L (ref 2–50)
ANION GAP SERPL CALC-SCNC: 11 MMOL/L (ref 7–16)
AST SERPL-CCNC: 22 U/L (ref 12–45)
BASOPHILS # BLD AUTO: 0.7 % (ref 0–1.8)
BASOPHILS # BLD: 0.06 K/UL (ref 0–0.12)
BILIRUB SERPL-MCNC: 1.7 MG/DL (ref 0.1–1.5)
BUN SERPL-MCNC: 9 MG/DL (ref 8–22)
CALCIUM ALBUM COR SERPL-MCNC: 8.9 MG/DL (ref 8.5–10.5)
CALCIUM SERPL-MCNC: 9.1 MG/DL (ref 8.5–10.5)
CHLORIDE SERPL-SCNC: 104 MMOL/L (ref 96–112)
CHOLEST SERPL-MCNC: 204 MG/DL (ref 100–199)
CO2 SERPL-SCNC: 24 MMOL/L (ref 20–33)
CREAT SERPL-MCNC: 0.79 MG/DL (ref 0.5–1.4)
EOSINOPHIL # BLD AUTO: 0.52 K/UL (ref 0–0.51)
EOSINOPHIL NFR BLD: 6.3 % (ref 0–6.9)
ERYTHROCYTE [DISTWIDTH] IN BLOOD BY AUTOMATED COUNT: 43.7 FL (ref 35.9–50)
FASTING STATUS PATIENT QL REPORTED: NORMAL
GFR SERPLBLD CREATININE-BSD FMLA CKD-EPI: 98 ML/MIN/1.73 M 2
GLOBULIN SER CALC-MCNC: 3 G/DL (ref 1.9–3.5)
GLUCOSE SERPL-MCNC: 95 MG/DL (ref 65–99)
HCT VFR BLD AUTO: 44.4 % (ref 37–47)
HDLC SERPL-MCNC: 58 MG/DL
HGB BLD-MCNC: 15.3 G/DL (ref 12–16)
IMM GRANULOCYTES # BLD AUTO: 0.03 K/UL (ref 0–0.11)
IMM GRANULOCYTES NFR BLD AUTO: 0.4 % (ref 0–0.9)
LDLC SERPL CALC-MCNC: 132 MG/DL
LYMPHOCYTES # BLD AUTO: 2.09 K/UL (ref 1–4.8)
LYMPHOCYTES NFR BLD: 25.2 % (ref 22–41)
MCH RBC QN AUTO: 33.7 PG (ref 27–33)
MCHC RBC AUTO-ENTMCNC: 34.5 G/DL (ref 32.2–35.5)
MCV RBC AUTO: 97.8 FL (ref 81.4–97.8)
MONOCYTES # BLD AUTO: 0.72 K/UL (ref 0–0.85)
MONOCYTES NFR BLD AUTO: 8.7 % (ref 0–13.4)
NEUTROPHILS # BLD AUTO: 4.88 K/UL (ref 1.82–7.42)
NEUTROPHILS NFR BLD: 58.7 % (ref 44–72)
NRBC # BLD AUTO: 0 K/UL
NRBC BLD-RTO: 0 /100 WBC (ref 0–0.2)
PLATELET # BLD AUTO: 248 K/UL (ref 164–446)
PMV BLD AUTO: 9.9 FL (ref 9–12.9)
POTASSIUM SERPL-SCNC: 4.3 MMOL/L (ref 3.6–5.5)
PROT SERPL-MCNC: 7.2 G/DL (ref 6–8.2)
RBC # BLD AUTO: 4.54 M/UL (ref 4.2–5.4)
SODIUM SERPL-SCNC: 139 MMOL/L (ref 135–145)
TRIGL SERPL-MCNC: 68 MG/DL (ref 0–149)
TSH SERPL DL<=0.005 MIU/L-ACNC: 1.19 UIU/ML (ref 0.38–5.33)
WBC # BLD AUTO: 8.3 K/UL (ref 4.8–10.8)

## 2024-01-26 PROCEDURE — 80061 LIPID PANEL: CPT

## 2024-01-26 PROCEDURE — 80053 COMPREHEN METABOLIC PANEL: CPT

## 2024-01-26 PROCEDURE — 84443 ASSAY THYROID STIM HORMONE: CPT

## 2024-01-26 PROCEDURE — 85025 COMPLETE CBC W/AUTO DIFF WBC: CPT

## 2024-01-26 PROCEDURE — 36415 COLL VENOUS BLD VENIPUNCTURE: CPT

## 2025-02-06 ENCOUNTER — HOSPITAL ENCOUNTER (OUTPATIENT)
Dept: RADIOLOGY | Facility: MEDICAL CENTER | Age: 40
End: 2025-02-06
Attending: OBSTETRICS & GYNECOLOGY
Payer: COMMERCIAL

## 2025-02-06 DIAGNOSIS — E04.9 ENLARGEMENT OF THYROID: ICD-10-CM

## 2025-02-06 PROCEDURE — 76536 US EXAM OF HEAD AND NECK: CPT

## (undated) DEVICE — DRAPE COLUMN  BOX OF 20

## (undated) DEVICE — SLEEVE, VASO, THIGH, MED

## (undated) DEVICE — KIT ANESTHESIA W/CIRCUIT & 3/LT BAG W/FILTER (20EA/CA)

## (undated) DEVICE — NEEDLE DRIVER MEGA SUTURECUT DA VINCI 15X'S REUSABLE

## (undated) DEVICE — ROBOTIC SURGERY SERVICES

## (undated) DEVICE — MASK ANESTHESIA ADULT  - (100/CA)

## (undated) DEVICE — CANISTER SUCTION 3000ML MECHANICAL FILTER AUTO SHUTOFF MEDI-VAC NONSTERILE LF DISP  (40EA/CA)

## (undated) DEVICE — TUBE CONNECT SUCTION CLEAR 120 X 1/4" (50EA/CA)"

## (undated) DEVICE — OBTURATOR BLADELESS STANDARD 8MM (6EA/BX)

## (undated) DEVICE — SODIUM CHL IRRIGATION 0.9% 1000ML (12EA/CA)

## (undated) DEVICE — TUBE NG SALEM SUMP 16FR (50EA/CA)

## (undated) DEVICE — GOWN WARMING STANDARD FLEX - (30/CA)

## (undated) DEVICE — SUTURE 2-0 20CM STRATAFIX SPIRAL SH NEEDLE (12/BX)

## (undated) DEVICE — CATHETER IV 18 GA X 1-3/4 ---SURG.& SDS ONLY---

## (undated) DEVICE — DERMABOND ADVANCED - (12EA/BX)

## (undated) DEVICE — SEAL 5MM-8MM UNIVERSAL  BOX OF 10

## (undated) DEVICE — SENSOR SPO2 NEO LNCS ADHESIVE (20/BX) SEE USER NOTES

## (undated) DEVICE — SUTURE 0 ETHIBOND MO6 C/R - (12/BX) 8-18 INCH ETHICON

## (undated) DEVICE — TROCAR 5X100 NON BLADED Z-TH - READ KII (6/BX)

## (undated) DEVICE — DRAPE ARM  BOX OF 20

## (undated) DEVICE — SUTURE 4-0 MONOCRYL PLUS PS-2 - 27 INCH (36/BX)

## (undated) DEVICE — ELECTRODE DUAL RETURN W/ CORD - (50/PK)

## (undated) DEVICE — TOWEL STOP TIMEOUT SAFETY FLAG (40EA/CA)

## (undated) DEVICE — BLADE SURGICAL #15 - (50/BX 3BX/CA)

## (undated) DEVICE — HEAD HOLDER JUNIOR/ADULT

## (undated) DEVICE — SET LEADWIRE 5 LEAD BEDSIDE DISPOSABLE ECG (1SET OF 5/EA)

## (undated) DEVICE — GLOVE BIOGEL PI INDICATOR SZ 8.0 SURGICAL PF LF -(50/BX 4BX/CA)

## (undated) DEVICE — GLOVE BIOGEL PI ULTRATOUCH SZ 7.5 SURGICAL PF LF -(50/BX 4BX/CA)

## (undated) DEVICE — COVER TIP ENDOWRIST HOT SHEAR - (10EA/BX) DA VINCI

## (undated) DEVICE — SUCTION INSTRUMENT YANKAUER BULBOUS TIP W/O VENT (50EA/CA)

## (undated) DEVICE — NEPTUNE 4 PORT MANIFOLD - (20/PK)

## (undated) DEVICE — GLOVE SZ 7.5 LF PROTEXIS (50PR/BX)

## (undated) DEVICE — Device

## (undated) DEVICE — SUTURE 3-0 VICRYL PLUS SH - 27 INCH (36/BX)

## (undated) DEVICE — SHEARS MONOPOLAR CURVED  DA VINCI 10X'S REUSABLE

## (undated) DEVICE — SYSTEM CLEARIFY VISUALIZATION (10EA/PK)

## (undated) DEVICE — GLOVE BIOGEL INDICATOR SZ 8 SURGICAL PF LTX - (50/BX 4BX/CA)

## (undated) DEVICE — LACTATED RINGERS INJ 1000 ML - (14EA/CA 60CA/PF)

## (undated) DEVICE — SET EXTENSION WITH 2 PORTS (48EA/CA) ***PART #2C8610 IS A SUBSTITUTE*****

## (undated) DEVICE — PROTECTOR ULNA NERVE - (36PR/CA)

## (undated) DEVICE — TUBING CLEARLINK DUO-VENT - C-FLO (48EA/CA)

## (undated) DEVICE — SUTURE GENERAL

## (undated) DEVICE — GLOVE BIOGEL PI ULTRATOUCH SZ 8.0 SURGICAL PF LF - (50/BX 4BX/CA)

## (undated) DEVICE — WATER IRRIGATION STERILE 1000ML (12EA/CA)

## (undated) DEVICE — ELECTRODE 850 FOAM ADHESIVE - HYDROGEL RADIOTRNSPRNT (50/PK)